# Patient Record
Sex: FEMALE | Race: BLACK OR AFRICAN AMERICAN | NOT HISPANIC OR LATINO | RURAL
[De-identification: names, ages, dates, MRNs, and addresses within clinical notes are randomized per-mention and may not be internally consistent; named-entity substitution may affect disease eponyms.]

---

## 2022-06-21 ENCOUNTER — OFFICE VISIT (OUTPATIENT)
Dept: OBSTETRICS AND GYNECOLOGY | Facility: CLINIC | Age: 32
End: 2022-06-21
Payer: OTHER GOVERNMENT

## 2022-06-21 DIAGNOSIS — O34.219 HISTORY OF CESAREAN DELIVERY, CURRENTLY PREGNANT: ICD-10-CM

## 2022-06-21 DIAGNOSIS — N91.1 SECONDARY AMENORRHEA: Primary | ICD-10-CM

## 2022-06-21 LAB
B-HCG UR QL: POSITIVE
CTP QC/QA: YES

## 2022-06-21 PROCEDURE — 81025 POCT URINE PREGNANCY: ICD-10-PCS | Mod: QW,,, | Performed by: OBSTETRICS & GYNECOLOGY

## 2022-06-21 PROCEDURE — 99499 UNLISTED E&M SERVICE: CPT | Mod: ,,, | Performed by: OBSTETRICS & GYNECOLOGY

## 2022-06-21 PROCEDURE — 76801 PR US, OB <14WKS, TRANSABD, SINGLE GESTATION: ICD-10-PCS | Mod: ,,, | Performed by: OBSTETRICS & GYNECOLOGY

## 2022-06-21 PROCEDURE — 76801 OB US < 14 WKS SINGLE FETUS: CPT | Mod: ,,, | Performed by: OBSTETRICS & GYNECOLOGY

## 2022-06-21 PROCEDURE — 81025 URINE PREGNANCY TEST: CPT | Mod: QW,,, | Performed by: OBSTETRICS & GYNECOLOGY

## 2022-06-21 PROCEDURE — 99203 OFFICE O/P NEW LOW 30 MIN: CPT | Mod: ,,, | Performed by: OBSTETRICS & GYNECOLOGY

## 2022-06-21 PROCEDURE — 99499 NO LOS: ICD-10-PCS | Mod: ,,, | Performed by: OBSTETRICS & GYNECOLOGY

## 2022-06-21 PROCEDURE — 99203 PR OFFICE/OUTPT VISIT, NEW, LEVL III, 30-44 MIN: ICD-10-PCS | Mod: ,,, | Performed by: OBSTETRICS & GYNECOLOGY

## 2022-06-21 RX ORDER — VITAMIN- MINERAL OMEGA-3 SUPPLEMENT 53.5; 38; 25; 1; 2; 3; 1.8; 5; 25; 300; 12.5; 2; 5; 10; 2 MG/1; MG/1; MG/1; MG/1; MG/1; MG/1; MG/1; MG/1; MG/1; UG/1; UG/1; MG/1; MG/1; MG/1; MG/1
1 CAPSULE, LIQUID FILLED ORAL DAILY
Qty: 30 CAPSULE | Refills: 11 | Status: SHIPPED | OUTPATIENT
Start: 2022-06-21 | End: 2023-06-21

## 2022-06-21 RX ORDER — CHOLECALCIFEROL (VITAMIN D3) 25 MCG
TABLET ORAL
COMMUNITY
Start: 2022-05-18 | End: 2022-08-16

## 2022-06-21 NOTE — PROGRESS NOTES
CC: Positive Pregnancy Test    HISTORY OF PRESENT ILLNESS:    Bette De La Cruz is a 31 y.o. female, ,  Presents today for a routine exam complaining of amenorrhea and positive home urine pregnancy test.  No LMP recorded.   She is not currently on any contraception.  Reports nausea. Reports breast tenderness. Denies vaginal bleeding and pelvic pain.      Review of patient's allergies indicates:  No Known Allergies    History reviewed. No pertinent past medical history.  Past Surgical History:   Procedure Laterality Date     SECTION       OB History        1    Para   1    Term   1            AB        Living   1       SAB        IAB        Ectopic        Multiple        Live Births   1               Family History   Family history unknown: Yes     Social History     Tobacco Use    Smoking status: Never Smoker    Smokeless tobacco: Never Used   Substance Use Topics    Alcohol use: Not Currently    Drug use: Never       Current Outpatient Medications   Medication Sig    vitamin D (VITAMIN D3) 1000 units Tab TAKE ONE TABLET BY MOUTH ONCE A DAY FOR SUPPLEMENTATION    mvn-min75-iron-iron ps-om3-dha (CONCEPT DHA) 35-1-200 mg Cap Take 1 capsule by mouth once daily.     No current facility-administered medications for this visit.       OB History    Para Term  AB Living   1 1 1     1   SAB IAB Ectopic Multiple Live Births           1      # Outcome Date GA Lbr Mikey/2nd Weight Sex Delivery Anes PTL Lv   1 Term 19 40w0d  2.835 kg (6 lb 4 oz) F CS-Unspec   ASA          ROS:  GENERAL: No weight changes. No swelling. No fatigue. No fever.  CARDIOVASCULAR: No chest pain. No shortness of breath. No leg cramps.   NEUROLOGICAL: No headaches. No vision changes.  BREASTS: No pain. No lumps. No discharge.  ABDOMEN: No pain. No diarrhea. No constipation.  REPRODUCTIVE: No abnormal bleeding.   VULVA: No pain. No lesions. No itching.  VAGINA: No relaxation. No itching. No odor. No  discharge. No lesions.  URINARY: No incontinence. No nocturia. No frequency. No dysuria.    There were no vitals taken for this visit.    PE:  AFFECT: Calm, alert and oriented X 3. Interactive during exam  GENERAL: Appears well-nourished, well-developed, in no acute distress.  HEAD: Normocephalic, atruamatic  TEETH: Good dentition.  THYROID: No thyromegally   BREASTS: No masses, skin changes, nipple discharge or adenopathy bilaterally.  SKIN: Normal for race, warm, & dry. No lesions or rashes.  LUNGS: Easy and unlabored, clear to auscultation bilaterally.  HEART: Regular rate and rhythm   ABDOMEN: Soft and nontender without masses or organomegally.  VULVA: No lesions, masses or tenderness.  VAGINA: Moist and well rugated without lesions or discharge.  CERVIX: Moist and pink without lesions, discharge or tenderness.      UTERUS SIZE: 6 week size, nontender and without masses.  ADNEXA: No masses or tenderness.  ESTIMATE OF PELVIC CAPACITY: Adequate  EXTREMITIES: No cyanosis, clubbing or edema. No calf tenderness.  LYMPH NODES: No axillary or inguinal adenopathy.      ASSESSMENT:       ICD-10-CM ICD-9-CM    1. Secondary amenorrhea  N91.1 626.0 POCT urine pregnancy      US OB <14 Wks, TransAbd, Single Gestation      mvn-min75-iron-iron ps-om3-dha (CONCEPT DHA) 35-1-200 mg Cap   2. History of  delivery, currently pregnant  O34.219 654.20           Plan:     Amenorrhea  Positive urine pregnancy test (KEVIN: 2023, EGA: 5w4d based on LMP)    -  Routine prenatal care    Nausea and vomiting in pregnancy    -  Education regarding lifestyle and dietary modifications    -  Advised use of B6/Unisom. Pt will notify us if no relief/worsening symptoms, will consider Zofran if needed.      1st TRIMESTER COUNSELING: Discussed all, booklet provided:  Common complaints of pregnancy  HIV and other routine prenatal tests including  genetic screening  Risk factors identified by prenatal history  Oriented to practice -  discussed anticipated course of prenatal care & indications for Ultrasound  Childbirth classes/Hospital facilities   Nutrition and weight gain counseling  Toxoplasmosis precautions (Cats/Raw Meat)  Sexual activity and exercise  Environmental/Work hazards  Travel  Tobacco (Ask, Advise, Assess, Assist, and Arrange), as well as alcohol and drug use  Use of any medications (Including supplements, Vitamins, Herbs, or OTC Drugs)  Domestic violence  Seat belt use      TERATOLOGY COUNSELING:   Discussed indications and options for aneuploidy screening - pamphlets given    -  Pt desires testing and orders placed    -  Pt declines testing  Dating US scheduled  FOLLOW-UP in 4 weeks with Dr. Jean Lafleur M.D., FCOG    OB/GYN

## 2022-06-28 ENCOUNTER — PROCEDURE VISIT (OUTPATIENT)
Dept: OBSTETRICS AND GYNECOLOGY | Facility: CLINIC | Age: 32
End: 2022-06-28
Payer: OTHER GOVERNMENT

## 2022-06-28 DIAGNOSIS — N91.1 SECONDARY AMENORRHEA: Primary | ICD-10-CM

## 2022-06-28 PROCEDURE — 99499 UNLISTED E&M SERVICE: CPT | Mod: ,,, | Performed by: OBSTETRICS & GYNECOLOGY

## 2022-06-28 PROCEDURE — 76801 OB US < 14 WKS SINGLE FETUS: CPT | Mod: ,,, | Performed by: OBSTETRICS & GYNECOLOGY

## 2022-06-28 PROCEDURE — 76801 PR US, OB <14WKS, TRANSABD, SINGLE GESTATION: ICD-10-PCS | Mod: ,,, | Performed by: OBSTETRICS & GYNECOLOGY

## 2022-06-28 PROCEDURE — 99499 NO LOS: ICD-10-PCS | Mod: ,,, | Performed by: OBSTETRICS & GYNECOLOGY

## 2022-07-26 ENCOUNTER — OFFICE VISIT (OUTPATIENT)
Dept: OBSTETRICS AND GYNECOLOGY | Facility: CLINIC | Age: 32
End: 2022-07-26
Payer: OTHER GOVERNMENT

## 2022-07-26 DIAGNOSIS — Z11.3 SCREEN FOR STD (SEXUALLY TRANSMITTED DISEASE): ICD-10-CM

## 2022-07-26 DIAGNOSIS — Z11.4 SCREENING FOR HIV (HUMAN IMMUNODEFICIENCY VIRUS): ICD-10-CM

## 2022-07-26 DIAGNOSIS — Z12.4 SCREENING FOR MALIGNANT NEOPLASM OF THE CERVIX: ICD-10-CM

## 2022-07-26 DIAGNOSIS — B37.31 VAGINAL YEAST INFECTION: ICD-10-CM

## 2022-07-26 DIAGNOSIS — Z72.51 HIGH RISK SEXUAL BEHAVIOR, UNSPECIFIED TYPE: ICD-10-CM

## 2022-07-26 DIAGNOSIS — Z01.419 ROUTINE GYNECOLOGICAL EXAMINATION: Primary | ICD-10-CM

## 2022-07-26 DIAGNOSIS — N89.8 VAGINAL DISCHARGE: ICD-10-CM

## 2022-07-26 DIAGNOSIS — Z3A.10 10 WEEKS GESTATION OF PREGNANCY: ICD-10-CM

## 2022-07-26 LAB
CANDIDA SPECIES: POSITIVE
GARDNERELLA: POSITIVE
HAV IGM SER QL: NORMAL
HBV CORE IGM SER QL: NORMAL
HBV SURFACE AG SERPL QL IA: NORMAL
HCV AB SER QL: NORMAL
HIV 1+O+2 AB SERPL QL: NORMAL
SYPHILIS AB INTERPRETATION: NORMAL
TRICHOMONAS: NEGATIVE

## 2022-07-26 PROCEDURE — 87591 CHLAMYDIA/GONORRHOEAE(GC), PCR: ICD-10-PCS | Mod: ,,, | Performed by: CLINICAL MEDICAL LABORATORY

## 2022-07-26 PROCEDURE — 87389 HIV 1 / 2 ANTIBODY: ICD-10-PCS | Mod: ,,, | Performed by: CLINICAL MEDICAL LABORATORY

## 2022-07-26 PROCEDURE — 87491 CHLMYD TRACH DNA AMP PROBE: CPT | Mod: ,,, | Performed by: CLINICAL MEDICAL LABORATORY

## 2022-07-26 PROCEDURE — 87624 HPV HI-RISK TYP POOLED RSLT: CPT | Mod: ,,, | Performed by: CLINICAL MEDICAL LABORATORY

## 2022-07-26 PROCEDURE — 87480 CANDIDA DNA DIR PROBE: CPT | Mod: ,,, | Performed by: CLINICAL MEDICAL LABORATORY

## 2022-07-26 PROCEDURE — 36415 PR COLLECTION VENOUS BLOOD,VENIPUNCTURE: ICD-10-PCS | Mod: ,,, | Performed by: CLINICAL MEDICAL LABORATORY

## 2022-07-26 PROCEDURE — 87510 BACTERIAL VAGINOSIS: ICD-10-PCS | Mod: ,,, | Performed by: CLINICAL MEDICAL LABORATORY

## 2022-07-26 PROCEDURE — 86780 TREPONEMA PALLIDUM: CPT | Mod: ,,, | Performed by: CLINICAL MEDICAL LABORATORY

## 2022-07-26 PROCEDURE — 87624 HUMAN PAPILLOMAVIRUS (HPV): ICD-10-PCS | Mod: ,,, | Performed by: CLINICAL MEDICAL LABORATORY

## 2022-07-26 PROCEDURE — 80074 ACUTE HEPATITIS PANEL: CPT | Mod: ,,, | Performed by: CLINICAL MEDICAL LABORATORY

## 2022-07-26 PROCEDURE — 87480 BACTERIAL VAGINOSIS: ICD-10-PCS | Mod: ,,, | Performed by: CLINICAL MEDICAL LABORATORY

## 2022-07-26 PROCEDURE — 80074 HEPATITIS PANEL, ACUTE: ICD-10-PCS | Mod: ,,, | Performed by: CLINICAL MEDICAL LABORATORY

## 2022-07-26 PROCEDURE — 87510 GARDNER VAG DNA DIR PROBE: CPT | Mod: ,,, | Performed by: CLINICAL MEDICAL LABORATORY

## 2022-07-26 PROCEDURE — 88142 CYTOPATH C/V THIN LAYER: CPT | Mod: GCY | Performed by: OBSTETRICS & GYNECOLOGY

## 2022-07-26 PROCEDURE — 87389 HIV-1 AG W/HIV-1&-2 AB AG IA: CPT | Mod: ,,, | Performed by: CLINICAL MEDICAL LABORATORY

## 2022-07-26 PROCEDURE — 99395 PREV VISIT EST AGE 18-39: CPT | Mod: ,,, | Performed by: OBSTETRICS & GYNECOLOGY

## 2022-07-26 PROCEDURE — 87591 N.GONORRHOEAE DNA AMP PROB: CPT | Mod: ,,, | Performed by: CLINICAL MEDICAL LABORATORY

## 2022-07-26 PROCEDURE — 87491 CHLAMYDIA/GONORRHOEAE(GC), PCR: ICD-10-PCS | Mod: ,,, | Performed by: CLINICAL MEDICAL LABORATORY

## 2022-07-26 PROCEDURE — 36415 COLL VENOUS BLD VENIPUNCTURE: CPT | Mod: ,,, | Performed by: CLINICAL MEDICAL LABORATORY

## 2022-07-26 PROCEDURE — 99395 PR PREVENTIVE VISIT,EST,18-39: ICD-10-PCS | Mod: ,,, | Performed by: OBSTETRICS & GYNECOLOGY

## 2022-07-26 PROCEDURE — 87660 TRICHOMONAS VAGIN DIR PROBE: CPT | Mod: ,,, | Performed by: CLINICAL MEDICAL LABORATORY

## 2022-07-26 PROCEDURE — 86780 TREPONEMA PALLIDUM (SYPHILIS) ANTIBODY: ICD-10-PCS | Mod: ,,, | Performed by: CLINICAL MEDICAL LABORATORY

## 2022-07-26 PROCEDURE — 87660 BACTERIAL VAGINOSIS: ICD-10-PCS | Mod: ,,, | Performed by: CLINICAL MEDICAL LABORATORY

## 2022-07-26 RX ORDER — MICONAZOLE NITRATE 2 %
1 CREAM WITH APPLICATOR VAGINAL NIGHTLY
Qty: 45 G | Refills: 0 | Status: SHIPPED | OUTPATIENT
Start: 2022-07-26 | End: 2023-01-04

## 2022-07-26 NOTE — PROGRESS NOTES
CC: Well woman exam    Bette De La Cruz is a 31 y.o. female  presents for well woman exam.  LMP: No LMP recorded..  No issues, problems, or complaints.    Last mammogram: N/A    No past medical history on file.  Past Surgical History:   Procedure Laterality Date     SECTION       Social History     Socioeconomic History    Marital status:    Tobacco Use    Smoking status: Never Smoker    Smokeless tobacco: Never Used   Substance and Sexual Activity    Alcohol use: Not Currently    Drug use: Never    Sexual activity: Yes     Partners: Male     Family History   Family history unknown: Yes     OB History        1    Para   1    Term   1            AB        Living   1       SAB        IAB        Ectopic        Multiple        Live Births   1                 There were no vitals taken for this visit.      ROS:  GENERAL: Denies weight gain or weight loss. Feeling well overall.   SKIN: Denies rash or lesions.   HEAD: Denies head injury or headache.   NODES: Denies enlarged lymph nodes.   CHEST: Denies chest pain or shortness of breath.   CARDIOVASCULAR: Denies palpitations or left sided chest pain.   ABDOMEN: No abdominal pain, constipation, diarrhea, nausea, vomiting or rectal bleeding.   URINARY: No frequency, dysuria, hematuria, or burning on urination.  REPRODUCTIVE: See HPI.   BREASTS: The patient performs breast self-examination and denies pain, lumps, or nipple discharge.   HEMATOLOGIC: No easy bruisability or excessive bleeding.   MUSCULOSKELETAL: Denies joint pain or swelling.   NEUROLOGIC: Denies syncope or weakness.   PSYCHIATRIC: Denies depression, anxiety or mood swings.    PHYSICAL EXAM:  APPEARANCE: Well nourished, well developed, in no acute distress.  AFFECT: WNL, alert and oriented x 3  SKIN: No acne or hirsutism  NECK: Neck symmetric without masses or thyromegaly  NODES: No inguinal, cervical, axillary, or femoral lymph node enlargement  CHEST: Good  respiratory effect  ABDOMEN: Soft.  No tenderness or masses.  No hepatosplenomegaly.  No hernias.  BREASTS: Symmetrical, no skin changes or visible lesions.  No palpable masses, nipple discharge bilaterally.  PELVIC: Normal external genitalia without lesions.  Normal hair distribution.  Adequate perineal body, normal urethral meatus.  Vagina moist and well rugated without lesions or discharge.  Cervix pink, without lesions, discharge or tenderness.  No significant cystocele or rectocele.  Bimanual exam shows uterus to be normal size, regular, mobile and nontender.  Adnexa without masses or tenderness.    EXTREMITIES: No edema.    Routine gynecological examination  -     Chlamydia/GC, PCR  -     HIV 1/2 Ag/Ab (4th Gen); Future; Expected date: 07/26/2022  -     Treponema Pallidum (Syphillis) Antibody; Future; Expected date: 07/26/2022  -     Hepatitis Panel, Acute; Future; Expected date: 07/26/2022  -     Bacterial Vaginosis; Future; Expected date: 07/26/2022  -     Misc Sendout Test, Blood Yuki; Future; Expected date: 07/26/2022  -     POCT Urinalysis  -     ThinPrep Pap Test; Future; Expected date: 07/26/2022    Screening for malignant neoplasm of the cervix  -     ThinPrep Pap Test; Future; Expected date: 07/26/2022    Screen for STD (sexually transmitted disease)  -     Chlamydia/GC, PCR  -     HIV 1/2 Ag/Ab (4th Gen); Future; Expected date: 07/26/2022  -     Treponema Pallidum (Syphillis) Antibody; Future; Expected date: 07/26/2022  -     Hepatitis Panel, Acute; Future; Expected date: 07/26/2022  -     Bacterial Vaginosis; Future; Expected date: 07/26/2022  -     Misc Sendout Test, Blood Yuki; Future; Expected date: 07/26/2022    High risk sexual behavior, unspecified type  -     Chlamydia/GC, PCR  -     HIV 1/2 Ag/Ab (4th Gen); Future; Expected date: 07/26/2022  -     Treponema Pallidum (Syphillis) Antibody; Future; Expected date: 07/26/2022  -     Hepatitis Panel, Acute; Future; Expected date: 07/26/2022  -      Bacterial Vaginosis; Future; Expected date: 07/26/2022  -     Misc Sendout Test, Blood Yuki; Future; Expected date: 07/26/2022    Screening for HIV (human immunodeficiency virus)  -     HIV 1/2 Ag/Ab (4th Gen); Future; Expected date: 07/26/2022    Vaginal discharge    Vaginal yeast infection  -     miconazole (MONISTAT 7) 2 % vaginal cream; Place 1 applicator vaginally every evening.  Dispense: 45 g; Refill: 0    10 weeks gestation of pregnancy  -     Chlamydia/GC, PCR  -     HIV 1/2 Ag/Ab (4th Gen); Future; Expected date: 07/26/2022  -     Treponema Pallidum (Syphillis) Antibody; Future; Expected date: 07/26/2022  -     Hepatitis Panel, Acute; Future; Expected date: 07/26/2022  -     Bacterial Vaginosis; Future; Expected date: 07/26/2022  -     Misc Sendout Test, Blood Yuki; Future; Expected date: 07/26/2022  -     POCT Urinalysis  -     ThinPrep Pap Test; Future; Expected date: 07/26/2022            Patient was counseled today on A.C.S. Pap guidelines and recommendations for yearly pelvic exams, mammograms and monthly self breast exams; to see her PCP for other health maintenance.   Exercise regimen encouraged  Healthy food choices encouraged  Questions answered to desired level of satisfaction  Verbalized understanding to all information and instructions    Follow up in about 1 year (around 7/26/2023) for Annual Exam.

## 2022-07-27 DIAGNOSIS — B37.31 VAGINAL CANDIDA: ICD-10-CM

## 2022-07-27 DIAGNOSIS — N76.0 BV (BACTERIAL VAGINOSIS): Primary | ICD-10-CM

## 2022-07-27 DIAGNOSIS — B96.89 BV (BACTERIAL VAGINOSIS): Primary | ICD-10-CM

## 2022-07-27 LAB
CHLAMYDIA BY PCR: NEGATIVE
N. GONORRHOEAE (GC) BY PCR: NEGATIVE

## 2022-07-27 RX ORDER — METRONIDAZOLE 500 MG/1
500 TABLET ORAL 2 TIMES DAILY
Qty: 14 TABLET | Refills: 0 | Status: SHIPPED | OUTPATIENT
Start: 2022-07-27 | End: 2022-08-03

## 2022-07-27 RX ORDER — TERCONAZOLE 4 MG/G
1 CREAM VAGINAL NIGHTLY
Qty: 45 G | Refills: 0 | Status: SHIPPED | OUTPATIENT
Start: 2022-07-27 | End: 2023-01-04

## 2022-07-28 LAB
GH SERPL-MCNC: NORMAL NG/ML
INSULIN SERPL-ACNC: NORMAL U[IU]/ML
LAB AP CLINICAL INFORMATION: NORMAL
LAB AP GYN INTERPRETATION: NEGATIVE
LAB AP PAP DISCLAIMER COMMENTS: NORMAL
RENIN PLAS-CCNC: NORMAL NG/ML/H

## 2022-07-28 NOTE — PROCEDURES
Procedures   Ultrasound note:    Uterus 7.894.19 x 5.43 cm    Right ovary 3.53 x 2.11 2.21 cm  Left ovary 3.0 x 2.25 x 2.06 cm gestational sac 5 weeks 6    Impression:  Early OB with gestational sac visualized endometrial cavity no fetal pole at this time  Estimated delivery by gestational sac only February 15, 2023  Estimated gestational age by gestational sac only 5 week 6 day

## 2022-07-29 NOTE — PROCEDURES
Procedures   Ultrasound note:    Uterus 8.64 x 5.24 x 5.39 cm    Right ovary 3.36 x 2.32 x 2.29 cm next day  Left ovary 2.60 x 2.77 x 6.09 cm    Crown-rump length 6 weeks 4 day  Fetal heart rate 100 20 cm    Impression:  IUP with fetal heart tones  Estimated delivery February 19, 2022  Estimated gestational age 6 weeks 4 day

## 2022-08-02 LAB
HPV 16: NEGATIVE
HPV 18: NEGATIVE
HPV OTHER: NEGATIVE

## 2022-08-08 ENCOUNTER — ROUTINE PRENATAL (OUTPATIENT)
Dept: OBSTETRICS AND GYNECOLOGY | Facility: CLINIC | Age: 32
End: 2022-08-08
Payer: OTHER GOVERNMENT

## 2022-08-08 VITALS
HEART RATE: 83 BPM | HEIGHT: 62 IN | BODY MASS INDEX: 30.25 KG/M2 | SYSTOLIC BLOOD PRESSURE: 116 MMHG | WEIGHT: 164.38 LBS | DIASTOLIC BLOOD PRESSURE: 70 MMHG

## 2022-08-08 DIAGNOSIS — Z3A.12 12 WEEKS GESTATION OF PREGNANCY: Primary | ICD-10-CM

## 2022-08-08 DIAGNOSIS — O34.219 HISTORY OF CESAREAN DELIVERY, CURRENTLY PREGNANT: ICD-10-CM

## 2022-08-08 DIAGNOSIS — Z36.9 UNSPECIFIED ANTENATAL SCREENING: ICD-10-CM

## 2022-08-08 LAB
ANISOCYTOSIS BLD QL SMEAR: ABNORMAL
BASOPHILS # BLD AUTO: 0.01 K/UL (ref 0–0.2)
BASOPHILS NFR BLD AUTO: 0.2 % (ref 0–1)
BILIRUB SERPL-MCNC: NORMAL MG/DL
BLOOD, POC UA: NORMAL
DIFFERENTIAL METHOD BLD: ABNORMAL
EOSINOPHIL # BLD AUTO: 0.29 K/UL (ref 0–0.5)
EOSINOPHIL NFR BLD AUTO: 6.7 % (ref 1–4)
ERYTHROCYTE [DISTWIDTH] IN BLOOD BY AUTOMATED COUNT: 16.6 % (ref 11.5–14.5)
EST. AVERAGE GLUCOSE BLD GHB EST-MCNC: 94 MG/DL
GLUCOSE UR QL STRIP: NORMAL
HBA1C MFR BLD HPLC: 5.4 % (ref 4.5–6.6)
HCT VFR BLD AUTO: 35.8 % (ref 38–47)
HGB BLD-MCNC: 11.2 G/DL (ref 12–16)
HYPOCHROMIA BLD QL SMEAR: ABNORMAL
IMM GRANULOCYTES # BLD AUTO: 0.01 K/UL (ref 0–0.04)
IMM GRANULOCYTES NFR BLD: 0.2 % (ref 0–0.4)
INDIRECT COOMBS: NORMAL
KETONES UR QL STRIP: NORMAL
LEUKOCYTE ESTERASE URINE, POC: NORMAL
LYMPHOCYTES # BLD AUTO: 1.02 K/UL (ref 1–4.8)
LYMPHOCYTES NFR BLD AUTO: 23.6 % (ref 27–41)
MCH RBC QN AUTO: 23.3 PG (ref 27–31)
MCHC RBC AUTO-ENTMCNC: 31.3 G/DL (ref 32–36)
MCV RBC AUTO: 74.6 FL (ref 80–96)
MICROCYTES BLD QL SMEAR: ABNORMAL
MONOCYTES # BLD AUTO: 0.48 K/UL (ref 0–0.8)
MONOCYTES NFR BLD AUTO: 11.1 % (ref 2–6)
MPC BLD CALC-MCNC: ABNORMAL G/DL
NEUTROPHILS # BLD AUTO: 2.52 K/UL (ref 1.8–7.7)
NEUTROPHILS NFR BLD AUTO: 58.2 % (ref 53–65)
NITRITE, POC UA: NORMAL
NRBC # BLD AUTO: 0 X10E3/UL
NRBC, AUTO (.00): 0 %
PH, POC UA: 6.5
PLATELET # BLD AUTO: 288 K/UL (ref 150–400)
PLATELET MORPHOLOGY: ABNORMAL
PROTEIN, POC: NORMAL
RBC # BLD AUTO: 4.8 M/UL (ref 4.2–5.4)
RH BLD: NORMAL
RUBV IGG SER-ACNC: NORMAL [IU]/ML
SPECIFIC GRAVITY, POC UA: 1.02
UROBILINOGEN, POC UA: 1
WBC # BLD AUTO: 4.33 K/UL (ref 4.5–11)

## 2022-08-08 PROCEDURE — 0502F PR SUBSEQUENT PRENATAL CARE: ICD-10-PCS | Mod: ,,, | Performed by: OBSTETRICS & GYNECOLOGY

## 2022-08-08 PROCEDURE — 85025 COMPLETE CBC W/AUTO DIFF WBC: CPT | Mod: ,,, | Performed by: CLINICAL MEDICAL LABORATORY

## 2022-08-08 PROCEDURE — 86900 TYPE & SCREEN: ICD-10-PCS | Mod: ,,, | Performed by: CLINICAL MEDICAL LABORATORY

## 2022-08-08 PROCEDURE — 86900 BLOOD TYPING SEROLOGIC ABO: CPT | Mod: ,,, | Performed by: CLINICAL MEDICAL LABORATORY

## 2022-08-08 PROCEDURE — 36415 COLL VENOUS BLD VENIPUNCTURE: CPT | Mod: ,,, | Performed by: OBSTETRICS & GYNECOLOGY

## 2022-08-08 PROCEDURE — 85660 SICKLE CELL SCREEN: ICD-10-PCS | Mod: ,,, | Performed by: CLINICAL MEDICAL LABORATORY

## 2022-08-08 PROCEDURE — 86850 RBC ANTIBODY SCREEN: CPT | Mod: ,,, | Performed by: CLINICAL MEDICAL LABORATORY

## 2022-08-08 PROCEDURE — 86901 TYPE & SCREEN: ICD-10-PCS | Mod: ,,, | Performed by: CLINICAL MEDICAL LABORATORY

## 2022-08-08 PROCEDURE — 36415 PR COLLECTION VENOUS BLOOD,VENIPUNCTURE: ICD-10-PCS | Mod: ,,, | Performed by: OBSTETRICS & GYNECOLOGY

## 2022-08-08 PROCEDURE — 86901 BLOOD TYPING SEROLOGIC RH(D): CPT | Mod: ,,, | Performed by: CLINICAL MEDICAL LABORATORY

## 2022-08-08 PROCEDURE — 86850 TYPE & SCREEN: ICD-10-PCS | Mod: ,,, | Performed by: CLINICAL MEDICAL LABORATORY

## 2022-08-08 PROCEDURE — 83036 HEMOGLOBIN GLYCOSYLATED A1C: CPT | Mod: ,,, | Performed by: CLINICAL MEDICAL LABORATORY

## 2022-08-08 PROCEDURE — 87086 URINE CULTURE/COLONY COUNT: CPT | Mod: ,,, | Performed by: CLINICAL MEDICAL LABORATORY

## 2022-08-08 PROCEDURE — 86762 RUBELLA ANTIBODY SCREEN: ICD-10-PCS | Mod: ,,, | Performed by: CLINICAL MEDICAL LABORATORY

## 2022-08-08 PROCEDURE — 0502F SUBSEQUENT PRENATAL CARE: CPT | Mod: ,,, | Performed by: OBSTETRICS & GYNECOLOGY

## 2022-08-08 PROCEDURE — 87086 CULTURE, URINE: ICD-10-PCS | Mod: ,,, | Performed by: CLINICAL MEDICAL LABORATORY

## 2022-08-08 PROCEDURE — 85025 CBC WITH DIFFERENTIAL: ICD-10-PCS | Mod: ,,, | Performed by: CLINICAL MEDICAL LABORATORY

## 2022-08-08 PROCEDURE — 85660 RBC SICKLE CELL TEST: CPT | Mod: ,,, | Performed by: CLINICAL MEDICAL LABORATORY

## 2022-08-08 PROCEDURE — 86762 RUBELLA ANTIBODY: CPT | Mod: ,,, | Performed by: CLINICAL MEDICAL LABORATORY

## 2022-08-08 PROCEDURE — 83036 HEMOGLOBIN A1C: ICD-10-PCS | Mod: ,,, | Performed by: CLINICAL MEDICAL LABORATORY

## 2022-08-08 NOTE — PROGRESS NOTES
31 y.o. female  at 12w3d   Reports fetal movement or fluttering. Denies any vaginal bleeding, leakage of fluid, cramping, contractions, or pressure.   She complains of nothing.  Pt states she is doing well without any concerns.     The following were addressed during this visit:    1-8 Weeks  - Lifestyle Discussion   - Warning Signs   - Course of Care   - Physiology of Pregnancy   - Nutrition and Supplements   - Domestic Abuse Screen   - HIV Counseling   - Smoking Intervention   - SPAAD/Insurance Verification   - Importance of Exclusive Breastfeeding for First 6 Months   - Continuation of Breastfeeding of Complimentary after intro of solid foods   - Benefits of Breastfeeding     8-12 Weeks  - Review lab tests   - Genetic Counseling (NT/CVS/Amino)   - Influenza IM (for due date  - 3/31)   - Non-pharmacologic Pain Relief Methods for Labor & Birth       Daily fetal kick counts, bleeding, and  labor/labor precautions discussed.  Questions answered to desired level of satisfaction  Verbalized understanding to all information and instructions provided.    Total weight gain/weight loss in pregnancy: -0.272 kg (-9.6 oz)     A: 12w3d     ICD-10-CM ICD-9-CM    1. 12 weeks gestation of pregnancy  Z3A.12 V22.2 POCT URINALYSIS      CBC Auto Differential      Type & Screen      Rubella Antibody Screen      Sickle Cell Screen      Hemoglobin A1C      Urine culture      POCT Urine Drug Screen Presump      CBC Auto Differential      Type & Screen      Rubella Antibody Screen      Sickle Cell Screen      Hemoglobin A1C      Urine culture   2. History of  delivery, currently pregnant  O34.219 654.20    3. Unspecified  screening  Z36.9 V28.9 CBC Auto Differential      Type & Screen      Rubella Antibody Screen      Sickle Cell Screen      Hemoglobin A1C      Urine culture      POCT Urine Drug Screen Presump      CBC Auto Differential      Type & Screen      Rubella Antibody Screen      Sickle Cell  Screen      Hemoglobin A1C      Urine culture

## 2022-08-09 LAB — HGB S BLD QL SOLY: NEGATIVE

## 2022-08-10 ENCOUNTER — PATIENT MESSAGE (OUTPATIENT)
Dept: OBSTETRICS AND GYNECOLOGY | Facility: CLINIC | Age: 32
End: 2022-08-10
Payer: OTHER GOVERNMENT

## 2022-08-10 LAB — UA COMPLETE W REFLEX CULTURE PNL UR: NORMAL

## 2022-09-08 ENCOUNTER — ROUTINE PRENATAL (OUTPATIENT)
Dept: OBSTETRICS AND GYNECOLOGY | Facility: CLINIC | Age: 32
End: 2022-09-08
Payer: OTHER GOVERNMENT

## 2022-09-08 VITALS
DIASTOLIC BLOOD PRESSURE: 67 MMHG | SYSTOLIC BLOOD PRESSURE: 121 MMHG | WEIGHT: 169.63 LBS | HEART RATE: 67 BPM | BODY MASS INDEX: 31.02 KG/M2

## 2022-09-08 DIAGNOSIS — O34.219 HISTORY OF CESAREAN DELIVERY, CURRENTLY PREGNANT: ICD-10-CM

## 2022-09-08 DIAGNOSIS — Z3A.16 16 WEEKS GESTATION OF PREGNANCY: ICD-10-CM

## 2022-09-08 DIAGNOSIS — Z36.9 UNSPECIFIED ANTENATAL SCREENING: Primary | ICD-10-CM

## 2022-09-08 DIAGNOSIS — Z36.9 ANTENATAL SCREENING ENCOUNTER: ICD-10-CM

## 2022-09-08 LAB
BILIRUB SERPL-MCNC: NORMAL MG/DL
BLOOD URINE, POC: NORMAL
COLOR, POC UA: YELLOW
GLUCOSE UR QL STRIP: NORMAL
KETONES UR QL STRIP: NORMAL
LEUKOCYTE ESTERASE URINE, POC: NORMAL
NITRITE, POC UA: NORMAL
PH, POC UA: 7.5
PROTEIN, POC: NORMAL
SPECIFIC GRAVITY, POC UA: 1.02
UROBILINOGEN, POC UA: 0.2

## 2022-09-08 PROCEDURE — 0502F PR SUBSEQUENT PRENATAL CARE: ICD-10-PCS | Mod: ,,, | Performed by: OBSTETRICS & GYNECOLOGY

## 2022-09-08 PROCEDURE — 0502F SUBSEQUENT PRENATAL CARE: CPT | Mod: ,,, | Performed by: OBSTETRICS & GYNECOLOGY

## 2022-09-08 NOTE — PROGRESS NOTES
31 y.o. female  at 16w6d   Reports fetal movement or fluttering. Denies any vaginal bleeding, leakage of fluid, cramping, contractions, or pressure.   She complains of nothing  Pt states she is doing well without any concerns.     The following were addressed during this visit:    13-16 Weeks  - Quad screen   - Anatomy Ultrasound   - Breastfeeding Concerns & Resources   - Importance of Early Skin to Skin Contact         Daily fetal kick counts, bleeding, and  labor/labor precautions discussed.  Questions answered to desired level of satisfaction  Verbalized understanding to all information and instructions provided.    Total weight gain/weight loss in pregnancy: 2.087 kg (4 lb 9.6 oz)     A: 16w6d     ICD-10-CM ICD-9-CM    1. Unspecified  screening  Z36.9 V28.9 POCT URINALYSIS W/O SCOPE      US OB 14+ Wks, TransAbd, Single Gestation      2. 16 weeks gestation of pregnancy  Z3A.16 V22.2 POCT URINALYSIS W/O SCOPE      3.  screening encounter  Z36.9 V28.9 POCT URINALYSIS W/O SCOPE      4. History of  delivery, currently pregnant  O34.219 654.20

## 2022-10-03 ENCOUNTER — ROUTINE PRENATAL (OUTPATIENT)
Dept: OBSTETRICS AND GYNECOLOGY | Facility: CLINIC | Age: 32
End: 2022-10-03
Payer: OTHER GOVERNMENT

## 2022-10-03 ENCOUNTER — HOSPITAL ENCOUNTER (OUTPATIENT)
Dept: RADIOLOGY | Facility: HOSPITAL | Age: 32
Discharge: HOME | End: 2022-10-03
Attending: OBSTETRICS & GYNECOLOGY
Payer: OTHER GOVERNMENT

## 2022-10-03 VITALS
DIASTOLIC BLOOD PRESSURE: 79 MMHG | SYSTOLIC BLOOD PRESSURE: 126 MMHG | BODY MASS INDEX: 31.64 KG/M2 | HEART RATE: 92 BPM | WEIGHT: 173 LBS

## 2022-10-03 DIAGNOSIS — O28.3 ECHOGENIC INTRACARDIAC FOCUS OF FETUS ON PRENATAL ULTRASOUND: ICD-10-CM

## 2022-10-03 DIAGNOSIS — Z36.9 UNSPECIFIED ANTENATAL SCREENING: ICD-10-CM

## 2022-10-03 DIAGNOSIS — O34.219 PREVIOUS CESAREAN DELIVERY AFFECTING PREGNANCY, ANTEPARTUM: ICD-10-CM

## 2022-10-03 DIAGNOSIS — Z3A.20 20 WEEKS GESTATION OF PREGNANCY: Primary | ICD-10-CM

## 2022-10-03 LAB
BILIRUB SERPL-MCNC: NORMAL MG/DL
BLOOD, POC UA: NORMAL
GLUCOSE UR QL STRIP: NORMAL
KETONES UR QL STRIP: NORMAL
LEUKOCYTE ESTERASE URINE, POC: NORMAL
NITRITE, POC UA: NORMAL
PH, POC UA: 6.5
PROTEIN, POC: NORMAL
SPECIFIC GRAVITY, POC UA: 1.02
UROBILINOGEN, POC UA: 1

## 2022-10-03 PROCEDURE — 76805 US OB 14+ WEEKS, TRANSABDOM, SINGLE GESTATION: ICD-10-PCS | Mod: 26,,, | Performed by: RADIOLOGY

## 2022-10-03 PROCEDURE — 0502F PR SUBSEQUENT PRENATAL CARE: ICD-10-PCS | Mod: ,,, | Performed by: OBSTETRICS & GYNECOLOGY

## 2022-10-03 PROCEDURE — 76805 OB US >/= 14 WKS SNGL FETUS: CPT | Mod: TC

## 2022-10-03 PROCEDURE — 76805 OB US >/= 14 WKS SNGL FETUS: CPT | Mod: 26,,, | Performed by: RADIOLOGY

## 2022-10-03 PROCEDURE — 0502F SUBSEQUENT PRENATAL CARE: CPT | Mod: ,,, | Performed by: OBSTETRICS & GYNECOLOGY

## 2022-10-03 RX ORDER — OMEPRAZOLE 20 MG/1
20 CAPSULE, DELAYED RELEASE ORAL
COMMUNITY
Start: 2022-08-02

## 2022-10-03 RX ORDER — LORATADINE 10 MG/1
10 TABLET ORAL
COMMUNITY
Start: 2022-08-02

## 2022-10-03 NOTE — PROGRESS NOTES
32 y.o. female  at 20w3d   Reports fetal movement or fluttering. Denies any vaginal bleeding, leakage of fluid, cramping, contractions, or pressure.   She complains of nothing  Pt states she is doing well without any concerns.     Vitals  BP: 126/79  Pulse: 92  Weight: 78.5 kg (173 lb)  Prenatal  Fundal Height (cm): 20 cm  Fetal Heart Rate: 140s  Movement: Present  Urine Albumin/Glucose  Urine Albumin: Trace  Urine Glucose: Negative  Edema  LLE Edema: None  RLE Edema: None  Facial: None  Additional Edema?: No    Prenatal Labs:  Lab Results   Component Value Date    GROUPTRH O POS 2022    HGB 11.2 (L) 2022    HCT 35.8 (L) 2022     2022    SICKLE Negative 2022    HEPBSAG Non-Reactive 2022    [id number] Non-Reactive 2022    LABNGO Negative 2022    LABURIN Skin/Urogenital Aisha Isolated, no further workup. 2022       The following were addressed during this visit:    17-20 Weeks  - Quickening   - Lifestyle   - Ultrasound   - Importance of Early and Frequent Breastfeeding   - Baby-led Feeding   - Frequent feeding to help assure optimal milk production       Daily fetal kick counts, bleeding, and  labor/labor precautions discussed.  Questions answered to desired level of satisfaction  Verbalized understanding to all information and instructions provided.    Total weight gain/weight loss in pregnancy: 3.629 kg (8 lb)     Follow up in about 4 weeks (around 10/31/2022) for NOLAN.    A: 20w3d     ICD-10-CM ICD-9-CM    1. 20 weeks gestation of pregnancy  Z3A.20 V22.2 POCT Urinalysis      2. Unspecified  screening  Z36.9 V28.9 POCT Urinalysis      3. Previous  delivery affecting pregnancy, antepartum  O34.219 654.23             Genie Lafleur M.D., FCOG    OB/GYN

## 2022-10-10 ENCOUNTER — TELEPHONE (OUTPATIENT)
Dept: OBSTETRICS AND GYNECOLOGY | Facility: CLINIC | Age: 32
End: 2022-10-10
Payer: OTHER GOVERNMENT

## 2022-10-10 NOTE — TELEPHONE ENCOUNTER
----- Message from Genie Lafleur MD sent at 10/5/2022 11:22 AM CDT -----  Pt needs MFM consult secondary to echogenic cardiac focus found on ultrasound.

## 2022-10-13 ENCOUNTER — PATIENT MESSAGE (OUTPATIENT)
Dept: OBSTETRICS AND GYNECOLOGY | Facility: CLINIC | Age: 32
End: 2022-10-13
Payer: OTHER GOVERNMENT

## 2022-10-13 NOTE — TELEPHONE ENCOUNTER
----- Message from Maddie Fishman sent at 10/13/2022  1:46 PM CDT -----  PATIENT CALLED ABOUT A REFERRAL FOR AN ULTRASOUND SHE IS SUPPOSED TO GET. SHE CAN BE REACHED @ [phone]

## 2022-10-31 ENCOUNTER — ROUTINE PRENATAL (OUTPATIENT)
Dept: OBSTETRICS AND GYNECOLOGY | Facility: CLINIC | Age: 32
End: 2022-10-31
Payer: OTHER GOVERNMENT

## 2022-10-31 VITALS
DIASTOLIC BLOOD PRESSURE: 65 MMHG | WEIGHT: 176 LBS | BODY MASS INDEX: 32.19 KG/M2 | HEART RATE: 84 BPM | SYSTOLIC BLOOD PRESSURE: 121 MMHG

## 2022-10-31 DIAGNOSIS — Z3A.24 24 WEEKS GESTATION OF PREGNANCY: Primary | ICD-10-CM

## 2022-10-31 DIAGNOSIS — O28.3 ECHOGENIC INTRACARDIAC FOCUS OF FETUS ON PRENATAL ULTRASOUND: ICD-10-CM

## 2022-10-31 DIAGNOSIS — Z36.9 UNSPECIFIED ANTENATAL SCREENING: ICD-10-CM

## 2022-10-31 LAB
BILIRUB SERPL-MCNC: NORMAL MG/DL
BLOOD, POC UA: NORMAL
GLUCOSE UR QL STRIP: NORMAL
KETONES UR QL STRIP: NORMAL
LEUKOCYTE ESTERASE URINE, POC: NORMAL
NITRITE, POC UA: NORMAL
PH, POC UA: 7
PROTEIN, POC: NORMAL
SPECIFIC GRAVITY, POC UA: 1.02
UROBILINOGEN, POC UA: 1

## 2022-10-31 PROCEDURE — 0502F SUBSEQUENT PRENATAL CARE: CPT | Mod: ,,, | Performed by: OBSTETRICS & GYNECOLOGY

## 2022-10-31 PROCEDURE — 0502F PR SUBSEQUENT PRENATAL CARE: ICD-10-PCS | Mod: ,,, | Performed by: OBSTETRICS & GYNECOLOGY

## 2022-10-31 NOTE — PROGRESS NOTES
32 y.o. female  at 24w3d   Reports fetal movement or fluttering. Denies any vaginal bleeding, leakage of fluid, cramping, contractions, or pressure.   She complains of nothing.  Pt states she is doing well without any concerns.     Vitals  BP: 121/65  Pulse: 84  Weight: 79.8 kg (176 lb)  Prenatal  Fundal Height (cm): 24 cm  Fetal Heart Rate: 150s  Movement: Present  Urine Albumin/Glucose  Urine Albumin: Negative  Urine Glucose: Negative  Edema  LLE Edema: None  RLE Edema: None  Facial: None  Additional Edema?: No    Prenatal Labs:  Lab Results   Component Value Date    GROUPTRH O POS 2022    HGB 11.2 (L) 2022    HCT 35.8 (L) 2022     2022    SICKLE Negative 2022    HEPBSAG Non-Reactive 2022    [id number] Non-Reactive 2022    LABNGO Negative 2022    LABURIN Skin/Urogenital Aisha Isolated, no further workup. 2022       The following were addressed during this visit:    21-24 Weeks  -  Labor Signs   - Travel During Pregnancy   - Gestational diabetes screening protocol   - Effective Position and Latch   - Risks of Formula Use   - Risks of pacifier use       Daily fetal kick counts, bleeding, and  labor/labor precautions discussed.  Questions answered to desired level of satisfaction  Verbalized understanding to all information and instructions provided.    Total weight gain/weight loss in pregnancy: 4.99 kg (11 lb)     Follow up in about 4 weeks (around 2022) for NOLAN, w/ 1 Hr GTT.    A: 24w3d     ICD-10-CM ICD-9-CM    1. 24 weeks gestation of pregnancy  Z3A.24 V22.2 POCT Urinalysis      2. Unspecified  screening  Z36.9 V28.9 POCT Urinalysis      Glucose, 1Hr Post Prandial      3. Echogenic intracardiac focus of fetus on prenatal ultrasound  O28.3 796.5             Genie Lafleur M.D., FCOG    OB/GYN

## 2022-11-28 ENCOUNTER — ROUTINE PRENATAL (OUTPATIENT)
Dept: OBSTETRICS AND GYNECOLOGY | Facility: CLINIC | Age: 32
End: 2022-11-28
Payer: OTHER GOVERNMENT

## 2022-11-28 VITALS
SYSTOLIC BLOOD PRESSURE: 128 MMHG | HEART RATE: 94 BPM | WEIGHT: 177.19 LBS | BODY MASS INDEX: 32.41 KG/M2 | DIASTOLIC BLOOD PRESSURE: 64 MMHG

## 2022-11-28 DIAGNOSIS — O34.219 PREVIOUS CESAREAN DELIVERY AFFECTING PREGNANCY, ANTEPARTUM: ICD-10-CM

## 2022-11-28 DIAGNOSIS — O28.3 ECHOGENIC INTRACARDIAC FOCUS OF FETUS ON PRENATAL ULTRASOUND: ICD-10-CM

## 2022-11-28 DIAGNOSIS — Z36.9 UNSPECIFIED ANTENATAL SCREENING: ICD-10-CM

## 2022-11-28 DIAGNOSIS — Z36.9 ANTENATAL SCREENING ENCOUNTER: ICD-10-CM

## 2022-11-28 DIAGNOSIS — Z3A.28 28 WEEKS GESTATION OF PREGNANCY: Primary | ICD-10-CM

## 2022-11-28 LAB
ANISOCYTOSIS BLD QL SMEAR: NORMAL
BASOPHILS # BLD AUTO: 0.02 K/UL (ref 0–0.2)
BASOPHILS NFR BLD AUTO: 0.3 % (ref 0–1)
BILIRUB SERPL-MCNC: NORMAL MG/DL
BLOOD, POC UA: NORMAL
CTP QC/QA: YES
DIFFERENTIAL METHOD BLD: ABNORMAL
EOSINOPHIL # BLD AUTO: 0.23 K/UL (ref 0–0.5)
EOSINOPHIL NFR BLD AUTO: 3.2 % (ref 1–4)
ERYTHROCYTE [DISTWIDTH] IN BLOOD BY AUTOMATED COUNT: 15.4 % (ref 11.5–14.5)
GLUCOSE SERPL-MCNC: 85 MG/DL (ref 74–106)
GLUCOSE UR QL STRIP: NORMAL
HCT VFR BLD AUTO: 31.3 % (ref 38–47)
HGB BLD-MCNC: 9.5 G/DL (ref 12–16)
HYPOCHROMIA BLD QL SMEAR: NORMAL
IMM GRANULOCYTES # BLD AUTO: 0.02 K/UL (ref 0–0.04)
IMM GRANULOCYTES NFR BLD: 0.3 % (ref 0–0.4)
KETONES UR QL STRIP: NORMAL
LEUKOCYTE ESTERASE URINE, POC: NORMAL
LYMPHOCYTES # BLD AUTO: 1.54 K/UL (ref 1–4.8)
LYMPHOCYTES NFR BLD AUTO: 21.4 % (ref 27–41)
MCH RBC QN AUTO: 23 PG (ref 27–31)
MCHC RBC AUTO-ENTMCNC: 30.4 G/DL (ref 32–36)
MCV RBC AUTO: 75.8 FL (ref 80–96)
MICROCYTES BLD QL SMEAR: NORMAL
MONOCYTES # BLD AUTO: 0.4 K/UL (ref 0–0.8)
MONOCYTES NFR BLD AUTO: 5.6 % (ref 2–6)
MPC BLD CALC-MCNC: ABNORMAL G/DL
NEUTROPHILS # BLD AUTO: 4.98 K/UL (ref 1.8–7.7)
NEUTROPHILS NFR BLD AUTO: 69.2 % (ref 53–65)
NITRITE, POC UA: NORMAL
NRBC # BLD AUTO: 0 X10E3/UL
NRBC, AUTO (.00): 0 %
PH, POC UA: 6.5
PLATELET # BLD AUTO: 287 K/UL (ref 150–400)
POC (AMP) AMPHETAMINE: NEGATIVE
POC (BAR) BARBITURATES: NEGATIVE
POC (BUP) BUPRENORPHINE: NEGATIVE
POC (BZO) BENZODIAZEPINES: NEGATIVE
POC (COC) COCAINE: NEGATIVE
POC (MDMA) METHYLENEDIOXYMETHAMPHETAMINE 3,4: NEGATIVE
POC (MET) METHAMPHETAMINE: NEGATIVE
POC (MOP) OPIATES: NEGATIVE
POC (MTD) METHADONE: NEGATIVE
POC (OXY) OXYCODONE: NEGATIVE
POC (PCP) PHENCYCLIDINE: NEGATIVE
POC (TCA) TRICYCLIC ANTIDEPRESSANTS: NEGATIVE
POC TEMPERATURE (URINE): 94
POC THC: NEGATIVE
POLYCHROMASIA BLD QL SMEAR: NORMAL
PROTEIN, POC: 30
RBC # BLD AUTO: 4.13 M/UL (ref 4.2–5.4)
SCHISTOCYTES BLD QL AUTO: NORMAL
SPECIFIC GRAVITY, POC UA: >=1.03
UROBILINOGEN, POC UA: 1
WBC # BLD AUTO: 7.19 K/UL (ref 4.5–11)

## 2022-11-28 PROCEDURE — 85025 CBC WITH DIFFERENTIAL: ICD-10-PCS | Mod: ,,, | Performed by: CLINICAL MEDICAL LABORATORY

## 2022-11-28 PROCEDURE — 80305 DRUG TEST PRSMV DIR OPT OBS: CPT | Mod: QW,,, | Performed by: OBSTETRICS & GYNECOLOGY

## 2022-11-28 PROCEDURE — 0502F SUBSEQUENT PRENATAL CARE: CPT | Mod: ,,, | Performed by: OBSTETRICS & GYNECOLOGY

## 2022-11-28 PROCEDURE — 82950 GLUCOSE, 1HR POST PRANDIAL: ICD-10-PCS | Mod: ,,, | Performed by: CLINICAL MEDICAL LABORATORY

## 2022-11-28 PROCEDURE — 36415 COLL VENOUS BLD VENIPUNCTURE: CPT | Mod: ,,, | Performed by: OBSTETRICS & GYNECOLOGY

## 2022-11-28 PROCEDURE — 0502F PR SUBSEQUENT PRENATAL CARE: ICD-10-PCS | Mod: ,,, | Performed by: OBSTETRICS & GYNECOLOGY

## 2022-11-28 PROCEDURE — 82950 GLUCOSE TEST: CPT | Mod: ,,, | Performed by: CLINICAL MEDICAL LABORATORY

## 2022-11-28 PROCEDURE — 36415 PR COLLECTION VENOUS BLOOD,VENIPUNCTURE: ICD-10-PCS | Mod: ,,, | Performed by: OBSTETRICS & GYNECOLOGY

## 2022-11-28 PROCEDURE — 80305 POCT URINE DRUG SCREEN PRESUMP: ICD-10-PCS | Mod: QW,,, | Performed by: OBSTETRICS & GYNECOLOGY

## 2022-11-28 PROCEDURE — 85025 COMPLETE CBC W/AUTO DIFF WBC: CPT | Mod: ,,, | Performed by: CLINICAL MEDICAL LABORATORY

## 2022-11-28 NOTE — PROGRESS NOTES
32 y.o. female  at 28w3d   Reports fetal movement or fluttering. Denies any vaginal bleeding, leakage of fluid, cramping, contractions, or pressure.   She complains of nothing.  Pt states she is doing well without any concerns.     Vitals  BP: 128/64  Pulse: 94  Weight: 80.4 kg (177 lb 3.2 oz)  Prenatal  Fundal Height (cm): 28 cm  Fetal Heart Rate: 140s  Movement: Present  Urine Albumin/Glucose  Urine Albumin: 1+  Urine Glucose: Negative  Edema  LLE Edema: None  RLE Edema: None  Facial: None  Additional Edema?: No    Prenatal Labs:  Lab Results   Component Value Date    GROUPTRH O POS 2022    HGB 11.2 (L) 2022    HCT 35.8 (L) 2022     2022    SICKLE Negative 2022    HEPBSAG Non-Reactive 2022    [id number] Non-Reactive 2022    LABNGO Negative 2022    LABURIN Skin/Urogenital Aisha Isolated, no further workup. 2022       The following were addressed during this visit:    25-28 Weeks  -  Labor Signs   - Childbirth Education   - Maternity Leave paperwork   - Smoking Intervention   - Weight Gain/Diet/Exercise   - Rhogam Given   - Rooming in baby during your hospital stay       Daily fetal kick counts, bleeding, and  labor/labor precautions discussed.  Questions answered to desired level of satisfaction  Verbalized understanding to all information and instructions provided.    Total weight gain/weight loss in pregnancy: 5.534 kg (12 lb 3.2 oz)     Follow up in about 4 weeks (around 2022) for NOLAN.    A: 28w3d     ICD-10-CM ICD-9-CM    1. 28 weeks gestation of pregnancy  Z3A.28 V22.2 POCT Urine Drug Screen Presump      CBC Auto Differential      POCT Urinalysis      2.  screening encounter  Z36.9 V28.9 POCT Urine Drug Screen Presump      CBC Auto Differential      POCT Urinalysis      3. Echogenic intracardiac focus of fetus on prenatal ultrasound  O28.3 796.5       4. Previous  delivery affecting pregnancy, antepartum   O34.219 654.23             Genie Lafleur M.D., OG    OB/GYN

## 2022-12-19 ENCOUNTER — ROUTINE PRENATAL (OUTPATIENT)
Dept: OBSTETRICS AND GYNECOLOGY | Facility: CLINIC | Age: 32
End: 2022-12-19
Payer: OTHER GOVERNMENT

## 2022-12-19 VITALS
DIASTOLIC BLOOD PRESSURE: 61 MMHG | SYSTOLIC BLOOD PRESSURE: 130 MMHG | HEART RATE: 92 BPM | WEIGHT: 177 LBS | BODY MASS INDEX: 32.37 KG/M2

## 2022-12-19 DIAGNOSIS — O34.219 PREVIOUS CESAREAN DELIVERY AFFECTING PREGNANCY, ANTEPARTUM: ICD-10-CM

## 2022-12-19 DIAGNOSIS — O28.3 ECHOGENIC INTRACARDIAC FOCUS OF FETUS ON PRENATAL ULTRASOUND: ICD-10-CM

## 2022-12-19 DIAGNOSIS — Z36.9 ANTENATAL SCREENING ENCOUNTER: Primary | ICD-10-CM

## 2022-12-19 DIAGNOSIS — N89.8 VAGINAL DISCHARGE DURING PREGNANCY IN THIRD TRIMESTER: ICD-10-CM

## 2022-12-19 DIAGNOSIS — Z3A.31 31 WEEKS GESTATION OF PREGNANCY: ICD-10-CM

## 2022-12-19 DIAGNOSIS — O26.893 VAGINAL DISCHARGE DURING PREGNANCY IN THIRD TRIMESTER: ICD-10-CM

## 2022-12-19 LAB
BILIRUB SERPL-MCNC: NORMAL MG/DL
BLOOD, POC UA: NORMAL
CANDIDA SPECIES: POSITIVE
GARDNERELLA: POSITIVE
GLUCOSE UR QL STRIP: NORMAL
KETONES UR QL STRIP: NORMAL
LEUKOCYTE ESTERASE URINE, POC: NORMAL
NITRITE, POC UA: NORMAL
PH, POC UA: 7.5
PROTEIN, POC: NORMAL
SPECIFIC GRAVITY, POC UA: 1.02
TRICHOMONAS: NEGATIVE
UROBILINOGEN, POC UA: 0.2

## 2022-12-19 PROCEDURE — 87480 CANDIDA DNA DIR PROBE: CPT | Mod: ,,, | Performed by: CLINICAL MEDICAL LABORATORY

## 2022-12-19 PROCEDURE — 87660 TRICHOMONAS VAGIN DIR PROBE: CPT | Mod: ,,, | Performed by: CLINICAL MEDICAL LABORATORY

## 2022-12-19 PROCEDURE — 87480 BACTERIAL VAGINOSIS: ICD-10-PCS | Mod: ,,, | Performed by: CLINICAL MEDICAL LABORATORY

## 2022-12-19 PROCEDURE — 87510 BACTERIAL VAGINOSIS: ICD-10-PCS | Mod: ,,, | Performed by: CLINICAL MEDICAL LABORATORY

## 2022-12-19 PROCEDURE — 87510 GARDNER VAG DNA DIR PROBE: CPT | Mod: ,,, | Performed by: CLINICAL MEDICAL LABORATORY

## 2022-12-19 PROCEDURE — 0502F SUBSEQUENT PRENATAL CARE: CPT | Mod: ,,, | Performed by: OBSTETRICS & GYNECOLOGY

## 2022-12-19 PROCEDURE — 87660 BACTERIAL VAGINOSIS: ICD-10-PCS | Mod: ,,, | Performed by: CLINICAL MEDICAL LABORATORY

## 2022-12-19 PROCEDURE — 0502F PR SUBSEQUENT PRENATAL CARE: ICD-10-PCS | Mod: ,,, | Performed by: OBSTETRICS & GYNECOLOGY

## 2022-12-19 NOTE — PROGRESS NOTES
32 y.o. female  at 31w3d   Reports fetal movement or fluttering. Denies any vaginal bleeding, leakage of fluid, cramping, contractions, or pressure.   She complains of vaginal discharge.  Pt states she is doing well without any concerns.     Vitals  BP: 130/61  Pulse: 92  Weight: 80.3 kg (177 lb)  Prenatal  Fundal Height (cm): 32 cm  Fetal Heart Rate: 140s  Movement: Present  Urine Albumin/Glucose  Urine Albumin: Negative  Urine Glucose: Negative  Edema  LLE Edema: None  RLE Edema: None  Facial: None  Additional Edema?: No    Prenatal Labs:  Lab Results   Component Value Date    GROUPTRH O POS 2022    HGB 9.5 (L) 2022    HCT 31.3 (L) 2022     2022    SICKLE Negative 2022    HEPBSAG Non-Reactive 2022    [id number] Non-Reactive 2022    LABNGO Negative 2022    LABURIN Skin/Urogenital Aisha Isolated, no further workup. 2022       The following were addressed during this visit:    29-32 Weeks  - Tdap Given   - Contraception/Tubal Consent   - Pre-registration   - Circumsision plans   - Op note review/ consent   - Birth Plan   - Pediatrician   - Fetal Kick Counts/PIH/PTL precautions   - Preeclampsia Education   - Quiet time       Daily fetal kick counts, bleeding, and  labor/labor precautions discussed.  Questions answered to desired level of satisfaction  Verbalized understanding to all information and instructions provided.    Total weight gain/weight loss in pregnancy: 5.443 kg (12 lb)     Follow up in about 2 weeks (around 2023) for NOLAN w/ .    A: 31w3d     ICD-10-CM ICD-9-CM    1.  screening encounter  Z36.9 V28.9 POCT Urinalysis      2. Echogenic intracardiac focus of fetus on prenatal ultrasound  O28.3 796.5       3. Previous  delivery affecting pregnancy, antepartum  O34.219 654.23       4. 31 weeks gestation of pregnancy  Z3A.31 V22.2 Bacterial Vaginosis      US OB 14+ Wks, TransAbd, Single Gestation      5.  Vaginal discharge during pregnancy in third trimester  O26.893 646.83 Bacterial Vaginosis    N89.8 623.5             Genie Lafleur M.D., FCOG    OB/GYN

## 2022-12-20 ENCOUNTER — PATIENT MESSAGE (OUTPATIENT)
Dept: OBSTETRICS AND GYNECOLOGY | Facility: CLINIC | Age: 32
End: 2022-12-20
Payer: OTHER GOVERNMENT

## 2022-12-20 DIAGNOSIS — B37.31 YEAST VAGINITIS: ICD-10-CM

## 2022-12-20 DIAGNOSIS — B96.89 BV (BACTERIAL VAGINOSIS): Primary | ICD-10-CM

## 2022-12-20 DIAGNOSIS — N76.0 BV (BACTERIAL VAGINOSIS): Primary | ICD-10-CM

## 2022-12-20 RX ORDER — METRONIDAZOLE 500 MG/1
500 TABLET ORAL 2 TIMES DAILY
Qty: 14 TABLET | Refills: 0 | Status: SHIPPED | OUTPATIENT
Start: 2022-12-20 | End: 2022-12-27

## 2022-12-20 RX ORDER — FLUCONAZOLE 150 MG/1
150 TABLET ORAL DAILY
Qty: 1 TABLET | Refills: 0 | Status: SHIPPED | OUTPATIENT
Start: 2022-12-20 | End: 2022-12-21

## 2023-01-03 ENCOUNTER — ROUTINE PRENATAL (OUTPATIENT)
Dept: OBSTETRICS AND GYNECOLOGY | Facility: CLINIC | Age: 33
End: 2023-01-03
Payer: OTHER GOVERNMENT

## 2023-01-03 ENCOUNTER — PROCEDURE VISIT (OUTPATIENT)
Dept: OBSTETRICS AND GYNECOLOGY | Facility: CLINIC | Age: 33
End: 2023-01-03
Attending: OBSTETRICS & GYNECOLOGY
Payer: OTHER GOVERNMENT

## 2023-01-03 VITALS
WEIGHT: 178.81 LBS | HEART RATE: 82 BPM | BODY MASS INDEX: 32.7 KG/M2 | DIASTOLIC BLOOD PRESSURE: 55 MMHG | SYSTOLIC BLOOD PRESSURE: 126 MMHG

## 2023-01-03 DIAGNOSIS — O34.219 PREVIOUS CESAREAN DELIVERY AFFECTING PREGNANCY, ANTEPARTUM: ICD-10-CM

## 2023-01-03 DIAGNOSIS — Z3A.33 33 WEEKS GESTATION OF PREGNANCY: Primary | ICD-10-CM

## 2023-01-03 DIAGNOSIS — O28.3 ECHOGENIC INTRACARDIAC FOCUS OF FETUS ON PRENATAL ULTRASOUND: ICD-10-CM

## 2023-01-03 DIAGNOSIS — Z36.9 ANTENATAL SCREENING ENCOUNTER: ICD-10-CM

## 2023-01-03 DIAGNOSIS — Z3A.31 31 WEEKS GESTATION OF PREGNANCY: ICD-10-CM

## 2023-01-03 LAB
BILIRUB SERPL-MCNC: NORMAL MG/DL
BLOOD, POC UA: NORMAL
GLUCOSE UR QL STRIP: NORMAL
KETONES UR QL STRIP: NORMAL
LEUKOCYTE ESTERASE URINE, POC: NORMAL
NITRITE, POC UA: NORMAL
PH, POC UA: 7
PROTEIN, POC: NORMAL
SPECIFIC GRAVITY, POC UA: 1.02
UROBILINOGEN, POC UA: 0.2

## 2023-01-03 PROCEDURE — 76819 FETAL BIOPHYS PROFIL W/O NST: CPT | Mod: ,,, | Performed by: OBSTETRICS & GYNECOLOGY

## 2023-01-03 PROCEDURE — 0502F SUBSEQUENT PRENATAL CARE: CPT | Mod: ,,, | Performed by: OBSTETRICS & GYNECOLOGY

## 2023-01-03 PROCEDURE — 99499 UNLISTED E&M SERVICE: CPT | Mod: ,,, | Performed by: OBSTETRICS & GYNECOLOGY

## 2023-01-03 PROCEDURE — 76805 PR US, OB 14+WKS, TRANSABD, SINGLE GESTATION: ICD-10-PCS | Mod: ,,, | Performed by: OBSTETRICS & GYNECOLOGY

## 2023-01-03 PROCEDURE — 0502F PR SUBSEQUENT PRENATAL CARE: ICD-10-PCS | Mod: ,,, | Performed by: OBSTETRICS & GYNECOLOGY

## 2023-01-03 PROCEDURE — 76805 OB US >/= 14 WKS SNGL FETUS: CPT | Mod: ,,, | Performed by: OBSTETRICS & GYNECOLOGY

## 2023-01-03 PROCEDURE — 76819 PR US, OB, FETAL BIOPHYSICAL, W/O NST: ICD-10-PCS | Mod: ,,, | Performed by: OBSTETRICS & GYNECOLOGY

## 2023-01-03 PROCEDURE — 99499 NO LOS: ICD-10-PCS | Mod: ,,, | Performed by: OBSTETRICS & GYNECOLOGY

## 2023-01-03 RX ORDER — BREAST PUMP
1 EACH MISCELLANEOUS DAILY PRN
Qty: 1 EACH | Refills: 0 | OUTPATIENT
Start: 2023-01-03 | End: 2024-01-03

## 2023-01-03 NOTE — PROGRESS NOTES
32 y.o. female  at 33w4d   Reports fetal movement or fluttering. Denies any vaginal bleeding, leakage of fluid, cramping, contractions, or pressure.   She complains of nothing.  Pt states she is doing well without any concerns.     Pt requests breast pump.    Vitals  BP: (!) 126/55  Pulse: 82  Weight: 81.1 kg (178 lb 12.8 oz)  Prenatal  Fundal Height (cm): 33 cm  Fetal Heart Rate: 140s  Movement: Present  Urine Albumin/Glucose  Urine Albumin: Negative  Urine Glucose: Negative  Edema  LLE Edema: None  RLE Edema: None  Facial: None  Additional Edema?: No    Prenatal Labs:  Lab Results   Component Value Date    GROUPTRH O POS 2022    HGB 9.5 (L) 2022    HCT 31.3 (L) 2022     2022    SICKLE Negative 2022    HEPBSAG Non-Reactive 2022    [id number] Non-Reactive 2022    LABNGO Negative 2022    LABURIN Skin/Urogenital Aisha Isolated, no further workup. 2022       The following were addressed during this visit:    33-36 Weeks  - Childbirth Education/Hospital Tours   - Breastfeeding   - Group B Strep Test/HIV/RPR   - Fetal Kick Counts/PIH/PTL precautions       Daily fetal kick counts, bleeding, and  labor/labor precautions discussed.  Questions answered to desired level of satisfaction  Verbalized understanding to all information and instructions provided.    Total weight gain/weight loss in pregnancy: 6.26 kg (13 lb 12.8 oz)     Follow up in about 2 weeks (around 2023) for NOLAN.    A: 33w4d     ICD-10-CM ICD-9-CM    1. 33 weeks gestation of pregnancy  Z3A.33 V22.2 POCT Urinalysis      2.  screening encounter  Z36.9 V28.9 POCT Urinalysis      3. Echogenic intracardiac focus of fetus on prenatal ultrasound  O28.3 796.5       4. Previous  delivery affecting pregnancy, antepartum  O34.219 654.23             Genie Lafleur M.D., FCOG    OB/GYN

## 2023-01-19 ENCOUNTER — ROUTINE PRENATAL (OUTPATIENT)
Dept: OBSTETRICS AND GYNECOLOGY | Facility: CLINIC | Age: 33
End: 2023-01-19
Payer: OTHER GOVERNMENT

## 2023-01-19 VITALS
WEIGHT: 180.81 LBS | HEART RATE: 94 BPM | BODY MASS INDEX: 33.07 KG/M2 | DIASTOLIC BLOOD PRESSURE: 70 MMHG | SYSTOLIC BLOOD PRESSURE: 121 MMHG

## 2023-01-19 DIAGNOSIS — Z36.85 ENCOUNTER FOR ANTENATAL SCREENING FOR STREPTOCOCCUS B: ICD-10-CM

## 2023-01-19 DIAGNOSIS — Z36.89 ENCOUNTER FOR OTHER SPECIFIED ANTENATAL SCREENING: ICD-10-CM

## 2023-01-19 DIAGNOSIS — Z36.9 ANTENATAL SCREENING ENCOUNTER: Primary | ICD-10-CM

## 2023-01-19 DIAGNOSIS — Z72.51 HIGH RISK SEXUAL BEHAVIOR, UNSPECIFIED TYPE: ICD-10-CM

## 2023-01-19 DIAGNOSIS — Z3A.35 35 WEEKS GESTATION OF PREGNANCY: ICD-10-CM

## 2023-01-19 DIAGNOSIS — Z11.3 SCREEN FOR STD (SEXUALLY TRANSMITTED DISEASE): ICD-10-CM

## 2023-01-19 DIAGNOSIS — O34.219 PATIENT DESIRES VAGINAL BIRTH AFTER CESAREAN SECTION (VBAC): ICD-10-CM

## 2023-01-19 LAB
ANISOCYTOSIS BLD QL SMEAR: NORMAL
BASOPHILS # BLD AUTO: 0.02 K/UL (ref 0–0.2)
BASOPHILS NFR BLD AUTO: 0.3 % (ref 0–1)
BILIRUB SERPL-MCNC: NORMAL MG/DL
BLOOD, POC UA: NORMAL
CANDIDA SPECIES: NEGATIVE
CTP QC/QA: YES
DIFFERENTIAL METHOD BLD: ABNORMAL
EOSINOPHIL # BLD AUTO: 0.28 K/UL (ref 0–0.5)
EOSINOPHIL NFR BLD AUTO: 3.5 % (ref 1–4)
ERYTHROCYTE [DISTWIDTH] IN BLOOD BY AUTOMATED COUNT: 16.2 % (ref 11.5–14.5)
GARDNERELLA: POSITIVE
GLUCOSE UR QL STRIP: NORMAL
HCT VFR BLD AUTO: 30 % (ref 38–47)
HGB BLD-MCNC: 9.3 G/DL (ref 12–16)
HYPOCHROMIA BLD QL SMEAR: NORMAL
IMM GRANULOCYTES # BLD AUTO: 0.02 K/UL (ref 0–0.04)
IMM GRANULOCYTES NFR BLD: 0.3 % (ref 0–0.4)
KETONES UR QL STRIP: NORMAL
LEUKOCYTE ESTERASE URINE, POC: NORMAL
LYMPHOCYTES # BLD AUTO: 1.71 K/UL (ref 1–4.8)
LYMPHOCYTES NFR BLD AUTO: 21.4 % (ref 27–41)
MCH RBC QN AUTO: 22.6 PG (ref 27–31)
MCHC RBC AUTO-ENTMCNC: 31 G/DL (ref 32–36)
MCV RBC AUTO: 72.8 FL (ref 80–96)
MICROCYTES BLD QL SMEAR: NORMAL
MONOCYTES # BLD AUTO: 0.47 K/UL (ref 0–0.8)
MONOCYTES NFR BLD AUTO: 5.9 % (ref 2–6)
MPC BLD CALC-MCNC: ABNORMAL G/DL
NEUTROPHILS # BLD AUTO: 5.49 K/UL (ref 1.8–7.7)
NEUTROPHILS NFR BLD AUTO: 68.6 % (ref 53–65)
NITRITE, POC UA: NORMAL
NRBC # BLD AUTO: 0 X10E3/UL
NRBC, AUTO (.00): 0 %
PH, POC UA: 6.5
PLATELET # BLD AUTO: 295 K/UL (ref 150–400)
PLATELET MORPHOLOGY: NORMAL
POC (AMP) AMPHETAMINE: NEGATIVE
POC (BAR) BARBITURATES: NEGATIVE
POC (BUP) BUPRENORPHINE: NEGATIVE
POC (BZO) BENZODIAZEPINES: NEGATIVE
POC (COC) COCAINE: NEGATIVE
POC (MDMA) METHYLENEDIOXYMETHAMPHETAMINE 3,4: NEGATIVE
POC (MET) METHAMPHETAMINE: NEGATIVE
POC (MOP) OPIATES: NEGATIVE
POC (MTD) METHADONE: NEGATIVE
POC (OXY) OXYCODONE: NEGATIVE
POC (PCP) PHENCYCLIDINE: NEGATIVE
POC (TCA) TRICYCLIC ANTIDEPRESSANTS: NEGATIVE
POC TEMPERATURE (URINE): 94
POC THC: NEGATIVE
PROTEIN, POC: NORMAL
RBC # BLD AUTO: 4.12 M/UL (ref 4.2–5.4)
SPECIFIC GRAVITY, POC UA: 1.02
SYPHILIS AB INTERPRETATION: NORMAL
TRICHOMONAS: NEGATIVE
UROBILINOGEN, POC UA: 1
WBC # BLD AUTO: 7.99 K/UL (ref 4.5–11)

## 2023-01-19 PROCEDURE — 87510 BACTERIAL VAGINOSIS: ICD-10-PCS | Mod: ,,, | Performed by: CLINICAL MEDICAL LABORATORY

## 2023-01-19 PROCEDURE — 87510 GARDNER VAG DNA DIR PROBE: CPT | Mod: ,,, | Performed by: CLINICAL MEDICAL LABORATORY

## 2023-01-19 PROCEDURE — 87653 STREP B DNA AMP PROBE: CPT | Mod: ,,, | Performed by: CLINICAL MEDICAL LABORATORY

## 2023-01-19 PROCEDURE — 85025 COMPLETE CBC W/AUTO DIFF WBC: CPT | Mod: ,,, | Performed by: CLINICAL MEDICAL LABORATORY

## 2023-01-19 PROCEDURE — 36415 PR COLLECTION VENOUS BLOOD,VENIPUNCTURE: ICD-10-PCS | Mod: ,,, | Performed by: OBSTETRICS & GYNECOLOGY

## 2023-01-19 PROCEDURE — 87660 BACTERIAL VAGINOSIS: ICD-10-PCS | Mod: ,,, | Performed by: CLINICAL MEDICAL LABORATORY

## 2023-01-19 PROCEDURE — 87653 STREP B SCREEN, ANTEPARTUM: ICD-10-PCS | Mod: ,,, | Performed by: CLINICAL MEDICAL LABORATORY

## 2023-01-19 PROCEDURE — 86780 TREPONEMA PALLIDUM (SYPHILIS) ANTIBODY: ICD-10-PCS | Mod: ,,, | Performed by: CLINICAL MEDICAL LABORATORY

## 2023-01-19 PROCEDURE — 86780 TREPONEMA PALLIDUM: CPT | Mod: ,,, | Performed by: CLINICAL MEDICAL LABORATORY

## 2023-01-19 PROCEDURE — 80305 DRUG TEST PRSMV DIR OPT OBS: CPT | Mod: QW,,, | Performed by: OBSTETRICS & GYNECOLOGY

## 2023-01-19 PROCEDURE — 80305 POCT URINE DRUG SCREEN PRESUMP: ICD-10-PCS | Mod: QW,,, | Performed by: OBSTETRICS & GYNECOLOGY

## 2023-01-19 PROCEDURE — 36415 COLL VENOUS BLD VENIPUNCTURE: CPT | Mod: ,,, | Performed by: OBSTETRICS & GYNECOLOGY

## 2023-01-19 PROCEDURE — 85025 CBC WITH DIFFERENTIAL: ICD-10-PCS | Mod: ,,, | Performed by: CLINICAL MEDICAL LABORATORY

## 2023-01-19 PROCEDURE — 87491 CHLMYD TRACH DNA AMP PROBE: CPT | Mod: ,,, | Performed by: CLINICAL MEDICAL LABORATORY

## 2023-01-19 PROCEDURE — 87591 N.GONORRHOEAE DNA AMP PROB: CPT | Mod: ,,, | Performed by: CLINICAL MEDICAL LABORATORY

## 2023-01-19 PROCEDURE — 87491 CHLAMYDIA/GONORRHOEAE(GC), PCR: ICD-10-PCS | Mod: ,,, | Performed by: CLINICAL MEDICAL LABORATORY

## 2023-01-19 PROCEDURE — 0502F SUBSEQUENT PRENATAL CARE: CPT | Mod: ,,, | Performed by: OBSTETRICS & GYNECOLOGY

## 2023-01-19 PROCEDURE — 87480 CANDIDA DNA DIR PROBE: CPT | Mod: ,,, | Performed by: CLINICAL MEDICAL LABORATORY

## 2023-01-19 PROCEDURE — 87480 BACTERIAL VAGINOSIS: ICD-10-PCS | Mod: ,,, | Performed by: CLINICAL MEDICAL LABORATORY

## 2023-01-19 PROCEDURE — 87591 CHLAMYDIA/GONORRHOEAE(GC), PCR: ICD-10-PCS | Mod: ,,, | Performed by: CLINICAL MEDICAL LABORATORY

## 2023-01-19 PROCEDURE — 0502F PR SUBSEQUENT PRENATAL CARE: ICD-10-PCS | Mod: ,,, | Performed by: OBSTETRICS & GYNECOLOGY

## 2023-01-19 PROCEDURE — 87660 TRICHOMONAS VAGIN DIR PROBE: CPT | Mod: ,,, | Performed by: CLINICAL MEDICAL LABORATORY

## 2023-01-19 NOTE — PROGRESS NOTES
32 y.o. female  at 35w6d   Reports fetal movement or fluttering. Denies any vaginal bleeding, leakage of fluid, cramping, contractions, or pressure.   She complains of contractions that are irregular in pattern.  Pt states she is doing well without any concerns.     Vitals  BP: 121/70  Pulse: 94  Weight: 82 kg (180 lb 12.8 oz)  Prenatal  Fundal Height (cm): 36 cm  Fetal Heart Rate: 140s  Movement: Present  Urine Albumin/Glucose  Urine Albumin: Trace  Urine Glucose: Negative  Edema  LLE Edema: None  RLE Edema: None  Facial: None  Additional Edema?: No  Cervical Exam  Dilation: 1  Effacement (%): 60  Station: -3  Presentation: Vertex  Station (Labor Curve): 8 cm  Dilation/Effacement/Station  Dilation: 1  Effacement (%): 60  Station: -3    Prenatal Labs:  Lab Results   Component Value Date    GROUPTRH O POS 2022    HGB 9.3 (L) 2023    HCT 30.0 (L) 2023     2023    SICKLE Negative 2022    HEPBSAG Non-Reactive 2022    [id number] Non-Reactive 2022    LABNGO Invalid 2023    LABURIN Skin/Urogenital Aisha Isolated, no further workup. 2022       No pregnancy checklist tasks were completed during this visit, and no tasks are pending completion.      Daily fetal kick counts, bleeding, and  labor/labor precautions discussed.  Questions answered to desired level of satisfaction  Verbalized understanding to all information and instructions provided.    Total weight gain/weight loss in pregnancy: 7.167 kg (15 lb 12.8 oz)     Follow up in about 1 week (around 2023) for NOLAN.    A: 35w6d     ICD-10-CM ICD-9-CM    1.  screening encounter  Z36.9 V28.9 POCT Urine Drug Screen Presump      CBC Auto Differential      Treponema Pallidum (Syphillis) Antibody      Chlamydia/GC, PCR      Bacterial Vaginosis      Strep B Screen, Antepartum      POCT Urinalysis      CBC Auto Differential      Treponema Pallidum (Syphillis) Antibody      US OB 14+ Weeks  TransAbd, w/Biophysical Profile, w/o NST, Single Gestation (xpd)      Chlamydia/GC, PCR      Bacterial Vaginosis      Strep B Screen, Antepartum      CBC Morphology      2. 35 weeks gestation of pregnancy  Z3A.35 V22.2 POCT Urine Drug Screen Presump      CBC Auto Differential      Treponema Pallidum (Syphillis) Antibody      Chlamydia/GC, PCR      Bacterial Vaginosis      Strep B Screen, Antepartum      POCT Urinalysis      CBC Auto Differential      Treponema Pallidum (Syphillis) Antibody      Chlamydia/GC, PCR      Bacterial Vaginosis      Strep B Screen, Antepartum      CBC Morphology      3. Screen for STD (sexually transmitted disease)  Z11.3 V74.5 Treponema Pallidum (Syphillis) Antibody      Chlamydia/GC, PCR      Bacterial Vaginosis      Treponema Pallidum (Syphillis) Antibody      Chlamydia/GC, PCR      Bacterial Vaginosis      4. High risk sexual behavior, unspecified type  Z72.51 V69.2 Treponema Pallidum (Syphillis) Antibody      Chlamydia/GC, PCR      Bacterial Vaginosis      Treponema Pallidum (Syphillis) Antibody      Chlamydia/GC, PCR      Bacterial Vaginosis      5. Encounter for other specified  screening  Z36.89 V28.9 POCT Urine Drug Screen Presump      6. Encounter for  screening for Streptococcus B  Z36.85 V28.6 Strep B Screen, Antepartum      Strep B Screen, Antepartum      7. Patient desires vaginal birth after  section ()  O34.219 654.20             Genie Lafleur M.D., FCOG    OB/GYN

## 2023-01-20 LAB — GROUP B STREP, PCR: NEGATIVE

## 2023-01-21 LAB
CHLAMYDIA BY PCR: NORMAL
N. GONORRHOEAE (GC) BY PCR: NORMAL

## 2023-01-26 ENCOUNTER — PROCEDURE VISIT (OUTPATIENT)
Dept: OBSTETRICS AND GYNECOLOGY | Facility: CLINIC | Age: 33
End: 2023-01-26
Attending: OBSTETRICS & GYNECOLOGY
Payer: OTHER GOVERNMENT

## 2023-01-26 ENCOUNTER — ROUTINE PRENATAL (OUTPATIENT)
Dept: OBSTETRICS AND GYNECOLOGY | Facility: CLINIC | Age: 33
End: 2023-01-26
Payer: OTHER GOVERNMENT

## 2023-01-26 VITALS
DIASTOLIC BLOOD PRESSURE: 64 MMHG | WEIGHT: 180.63 LBS | BODY MASS INDEX: 33.03 KG/M2 | SYSTOLIC BLOOD PRESSURE: 126 MMHG | HEART RATE: 81 BPM

## 2023-01-26 DIAGNOSIS — B96.89 BV (BACTERIAL VAGINOSIS): ICD-10-CM

## 2023-01-26 DIAGNOSIS — Z36.9 ANTENATAL SCREENING ENCOUNTER: ICD-10-CM

## 2023-01-26 DIAGNOSIS — Z3A.36 36 WEEKS GESTATION OF PREGNANCY: ICD-10-CM

## 2023-01-26 DIAGNOSIS — O34.219 PREVIOUS CESAREAN DELIVERY AFFECTING PREGNANCY, ANTEPARTUM: ICD-10-CM

## 2023-01-26 DIAGNOSIS — N76.0 BV (BACTERIAL VAGINOSIS): ICD-10-CM

## 2023-01-26 DIAGNOSIS — O28.3 ECHOGENIC INTRACARDIAC FOCUS OF FETUS ON PRENATAL ULTRASOUND: ICD-10-CM

## 2023-01-26 DIAGNOSIS — Z3A.36 36 WEEKS GESTATION OF PREGNANCY: Primary | ICD-10-CM

## 2023-01-26 DIAGNOSIS — O34.219 PATIENT DESIRES VAGINAL BIRTH AFTER CESAREAN SECTION (VBAC): ICD-10-CM

## 2023-01-26 DIAGNOSIS — Z36.9 ANTENATAL SCREENING ENCOUNTER: Primary | ICD-10-CM

## 2023-01-26 LAB
BILIRUB SERPL-MCNC: NORMAL MG/DL
BLOOD, POC UA: NORMAL
GLUCOSE UR QL STRIP: NORMAL
KETONES UR QL STRIP: NORMAL
LEUKOCYTE ESTERASE URINE, POC: NORMAL
NITRITE, POC UA: NORMAL
PH, POC UA: 6
PROTEIN, POC: NORMAL
SPECIFIC GRAVITY, POC UA: 1.02
UROBILINOGEN, POC UA: 0.2

## 2023-01-26 PROCEDURE — 0502F SUBSEQUENT PRENATAL CARE: CPT | Mod: ,,, | Performed by: OBSTETRICS & GYNECOLOGY

## 2023-01-26 PROCEDURE — 76805 PR US, OB 14+WKS, TRANSABD, SINGLE GESTATION: ICD-10-PCS | Mod: ,,, | Performed by: OBSTETRICS & GYNECOLOGY

## 2023-01-26 PROCEDURE — 99499 NO LOS: ICD-10-PCS | Mod: ,,, | Performed by: OBSTETRICS & GYNECOLOGY

## 2023-01-26 PROCEDURE — 0502F PR SUBSEQUENT PRENATAL CARE: ICD-10-PCS | Mod: ,,, | Performed by: OBSTETRICS & GYNECOLOGY

## 2023-01-26 PROCEDURE — 76805 OB US >/= 14 WKS SNGL FETUS: CPT | Mod: ,,, | Performed by: OBSTETRICS & GYNECOLOGY

## 2023-01-26 PROCEDURE — 76819 FETAL BIOPHYS PROFIL W/O NST: CPT | Mod: ,,, | Performed by: OBSTETRICS & GYNECOLOGY

## 2023-01-26 PROCEDURE — 76819 PR US, OB, FETAL BIOPHYSICAL, W/O NST: ICD-10-PCS | Mod: ,,, | Performed by: OBSTETRICS & GYNECOLOGY

## 2023-01-26 PROCEDURE — 99499 UNLISTED E&M SERVICE: CPT | Mod: ,,, | Performed by: OBSTETRICS & GYNECOLOGY

## 2023-01-26 RX ORDER — DICLOFENAC SODIUM 10 MG/G
GEL TOPICAL
COMMUNITY
Start: 2023-01-20

## 2023-01-26 RX ORDER — METRONIDAZOLE 500 MG/1
500 TABLET ORAL 2 TIMES DAILY
Qty: 14 TABLET | Refills: 0 | Status: SHIPPED | OUTPATIENT
Start: 2023-01-26 | End: 2023-02-02

## 2023-01-26 NOTE — PROGRESS NOTES
32 y.o. female  at 36w6d   Reports fetal movement or fluttering. Denies any vaginal bleeding, leakage of fluid, cramping, contractions, or pressure.   She complains of nothing.  Pt states she is doing well without any concerns.     Vitals  BP: 126/64  Pulse: 81  Weight: 81.9 kg (180 lb 9.6 oz)  Prenatal  Fundal Height (cm): 38 cm  Fetal Heart Rate: 130s  Movement: Present  Urine Albumin/Glucose  Urine Albumin: Negative  Urine Glucose: Negative  Edema  LLE Edema: None  RLE Edema: None  Facial: None  Additional Edema?: No  Cervical Exam  Dilation: 1  Effacement (%): 60  Station: Ballotable  Presentation: Vertex  Station (Labor Curve): 9 cm  Dilation/Effacement/Station  Dilation: 1  Effacement (%): 60  Station: Ballotable    Prenatal Labs:  Lab Results   Component Value Date    GROUPTRH O POS 2022    HGB 9.3 (L) 2023    HCT 30.0 (L) 2023     2023    SICKLE Negative 2022    HEPBSAG Non-Reactive 2022    [id number] Non-Reactive 2022    LABNGO Invalid 2023    LABURIN Skin/Urogenital Aisha Isolated, no further workup. 2022       No pregnancy checklist tasks were completed during this visit, and no tasks are pending completion.    Pt is to go to [facility] for US on 2023 to make sure baby is practicing breathing.    Daily fetal kick counts, bleeding, and  labor/labor precautions discussed.  Questions answered to desired level of satisfaction  Verbalized understanding to all information and instructions provided.    Total weight gain/weight loss in pregnancy: 7.076 kg (15 lb 9.6 oz)     Follow up in about 1 week (around 2023) for NOLAN.    A: 36w6d     ICD-10-CM ICD-9-CM    1.  screening encounter  Z36.9 V28.9 POCT Urinalysis      2. 36 weeks gestation of pregnancy  Z3A.36 V22.2 POCT Urinalysis      US OB Limited with Biophysical Profile (xpd)      3. BV (bacterial vaginosis)  N76.0 616.10 metroNIDAZOLE (FLAGYL) 500 MG tablet     B96.89 041.9       4. Patient desires vaginal birth after  section ()  O34.219 654.20 US OB Limited with Biophysical Profile (xpd)      5. Previous  delivery affecting pregnancy, antepartum  O34.219 654.23 US OB Limited with Biophysical Profile (xpd)      6. Echogenic intracardiac focus of fetus on prenatal ultrasound  O28.3 796.5 US OB Limited with Biophysical Profile (xpd)            Genie Lafleur M.D., FCOG    OB/GYN

## 2023-01-27 ENCOUNTER — HOSPITAL ENCOUNTER (OUTPATIENT)
Facility: HOSPITAL | Age: 33
Discharge: HOME | End: 2023-01-27
Attending: SPECIALIST | Admitting: SPECIALIST
Payer: OTHER GOVERNMENT

## 2023-01-27 VITALS
HEIGHT: 62 IN | TEMPERATURE: 97 F | RESPIRATION RATE: 20 BRPM | HEART RATE: 87 BPM | SYSTOLIC BLOOD PRESSURE: 127 MMHG | WEIGHT: 181 LBS | DIASTOLIC BLOOD PRESSURE: 56 MMHG | BODY MASS INDEX: 33.31 KG/M2 | OXYGEN SATURATION: 98 %

## 2023-01-27 DIAGNOSIS — Z34.90 PREGNANT: ICD-10-CM

## 2023-01-27 PROCEDURE — 99211 OFF/OP EST MAY X REQ PHY/QHP: CPT | Mod: 25

## 2023-02-02 ENCOUNTER — ROUTINE PRENATAL (OUTPATIENT)
Dept: OBSTETRICS AND GYNECOLOGY | Facility: CLINIC | Age: 33
End: 2023-02-02
Payer: OTHER GOVERNMENT

## 2023-02-02 ENCOUNTER — PROCEDURE VISIT (OUTPATIENT)
Dept: OBSTETRICS AND GYNECOLOGY | Facility: CLINIC | Age: 33
End: 2023-02-02
Attending: OBSTETRICS & GYNECOLOGY
Payer: OTHER GOVERNMENT

## 2023-02-02 VITALS
WEIGHT: 184.81 LBS | BODY MASS INDEX: 33.8 KG/M2 | HEART RATE: 88 BPM | DIASTOLIC BLOOD PRESSURE: 59 MMHG | SYSTOLIC BLOOD PRESSURE: 122 MMHG

## 2023-02-02 DIAGNOSIS — O34.219 PREVIOUS CESAREAN DELIVERY AFFECTING PREGNANCY, ANTEPARTUM: ICD-10-CM

## 2023-02-02 DIAGNOSIS — O34.219 PATIENT DESIRES VAGINAL BIRTH AFTER CESAREAN SECTION (VBAC): ICD-10-CM

## 2023-02-02 DIAGNOSIS — Z3A.37 37 WEEKS GESTATION OF PREGNANCY: ICD-10-CM

## 2023-02-02 DIAGNOSIS — O28.3 ECHOGENIC INTRACARDIAC FOCUS OF FETUS ON PRENATAL ULTRASOUND: ICD-10-CM

## 2023-02-02 DIAGNOSIS — Z36.9 ANTENATAL SCREENING ENCOUNTER: Primary | ICD-10-CM

## 2023-02-02 PROCEDURE — 0502F PR SUBSEQUENT PRENATAL CARE: ICD-10-PCS | Mod: ,,, | Performed by: OBSTETRICS & GYNECOLOGY

## 2023-02-02 PROCEDURE — 99499 NO LOS: ICD-10-PCS | Mod: ,,, | Performed by: OBSTETRICS & GYNECOLOGY

## 2023-02-02 PROCEDURE — 76819 PR US, OB, FETAL BIOPHYSICAL, W/O NST: ICD-10-PCS | Mod: ,,, | Performed by: OBSTETRICS & GYNECOLOGY

## 2023-02-02 PROCEDURE — 99499 UNLISTED E&M SERVICE: CPT | Mod: ,,, | Performed by: OBSTETRICS & GYNECOLOGY

## 2023-02-02 PROCEDURE — 76819 FETAL BIOPHYS PROFIL W/O NST: CPT | Mod: ,,, | Performed by: OBSTETRICS & GYNECOLOGY

## 2023-02-02 PROCEDURE — 0502F SUBSEQUENT PRENATAL CARE: CPT | Mod: ,,, | Performed by: OBSTETRICS & GYNECOLOGY

## 2023-02-02 PROCEDURE — 76805 PR US, OB 14+WKS, TRANSABD, SINGLE GESTATION: ICD-10-PCS | Mod: ,,, | Performed by: OBSTETRICS & GYNECOLOGY

## 2023-02-02 PROCEDURE — 76805 OB US >/= 14 WKS SNGL FETUS: CPT | Mod: ,,, | Performed by: OBSTETRICS & GYNECOLOGY

## 2023-02-02 NOTE — PROGRESS NOTES
32 y.o. female  at 37w6d   Reports fetal movement or fluttering. Denies any vaginal bleeding, leakage of fluid, cramping, contractions, or pressure.   She complains of vaginal pain that comes and goes in a irregular pattern.  Pt states she is doing well without any concerns.     Vitals  BP: (!) 122/59  Pulse: 88  Weight: 83.8 kg (184 lb 12.8 oz)  Prenatal  Fundal Height (cm): 37 cm  Fetal Heart Rate: 140s  Movement: Present  Urine Albumin/Glucose  Urine Albumin: Trace  Urine Glucose: Negative  Edema  LLE Edema: None  RLE Edema: None  Facial: None  Additional Edema?: No  Cervical Exam  Dilation: 1  Effacement (%): 60  Station: Ballotable  Presentation: Vertex  Station (Labor Curve): 9 cm  Dilation/Effacement/Station  Dilation: 1  Effacement (%): 60  Station: Ballotable    Prenatal Labs:  Lab Results   Component Value Date    GROUPTRH O POS 2022    HGB 9.3 (L) 2023    HCT 30.0 (L) 2023     2023    SICKLE Negative 2022    HEPBSAG Non-Reactive 2022    [id number] Non-Reactive 2022    LABNGO Invalid 2023    LABURIN Skin/Urogenital Aisha Isolated, no further workup. 2022       The following were addressed during this visit:    37-41 Weeks  - Postpartum Immunizations   - Hospital Stay Expectations   - When to go to the hospital   - Fetal kick counts/PIH/Bleeding/ROM/Labor precautions   - Labor induction policy   - Cervical ripening   - Breastfeeding review: benefits of breastfeeding, early skin to skin, 24/7 rooming in, exclusive breastfeeding for 6 months       Daily fetal kick counts, bleeding, and  labor/labor precautions discussed.  Questions answered to desired level of satisfaction  Verbalized understanding to all information and instructions provided.    Total weight gain/weight loss in pregnancy: 8.981 kg (19 lb 12.8 oz)     Follow up in about 1 week (around 2023) for NOLAN.    A: 37w6d     ICD-10-CM ICD-9-CM    1.  screening  encounter  Z36.9 V28.9 POCT Urinalysis      2. 37 weeks gestation of pregnancy  Z3A.37 V22.2 POCT Urinalysis      US OB 14+ Weeks TransAbd, w/Biophysical Profile, w/o NST, Single Gestation (xpd)      3. Patient desires vaginal birth after  section ()  O34.219 654.20       4. Previous  delivery affecting pregnancy, antepartum  O34.219 654.23       5. Echogenic intracardiac focus of fetus on prenatal ultrasound  O28.3 796.5 US OB 14+ Weeks TransAbd, w/Biophysical Profile, w/o NST, Single Gestation (xpd)            Genie Lafleur M.D., FCOG    OB/GYN

## 2023-02-09 ENCOUNTER — ANESTHESIA (OUTPATIENT)
Dept: OBSTETRICS AND GYNECOLOGY | Facility: HOSPITAL | Age: 33
End: 2023-02-09
Payer: OTHER GOVERNMENT

## 2023-02-09 ENCOUNTER — ROUTINE PRENATAL (OUTPATIENT)
Dept: OBSTETRICS AND GYNECOLOGY | Facility: CLINIC | Age: 33
End: 2023-02-09
Payer: OTHER GOVERNMENT

## 2023-02-09 ENCOUNTER — HOSPITAL ENCOUNTER (INPATIENT)
Facility: HOSPITAL | Age: 33
LOS: 2 days | Discharge: HOME | End: 2023-02-12
Attending: OBSTETRICS & GYNECOLOGY | Admitting: OBSTETRICS & GYNECOLOGY
Payer: OTHER GOVERNMENT

## 2023-02-09 ENCOUNTER — PROCEDURE VISIT (OUTPATIENT)
Dept: OBSTETRICS AND GYNECOLOGY | Facility: CLINIC | Age: 33
End: 2023-02-09
Attending: OBSTETRICS & GYNECOLOGY
Payer: OTHER GOVERNMENT

## 2023-02-09 VITALS
WEIGHT: 184.19 LBS | BODY MASS INDEX: 33.69 KG/M2 | HEART RATE: 67 BPM | SYSTOLIC BLOOD PRESSURE: 119 MMHG | DIASTOLIC BLOOD PRESSURE: 61 MMHG

## 2023-02-09 DIAGNOSIS — Z3A.38 38 WEEKS GESTATION OF PREGNANCY: ICD-10-CM

## 2023-02-09 DIAGNOSIS — O34.219 PREVIOUS CESAREAN DELIVERY AFFECTING PREGNANCY, ANTEPARTUM: ICD-10-CM

## 2023-02-09 DIAGNOSIS — O42.90 DELAYED DELIVERY AFTER SROM (SPONTANEOUS RUPTURE OF MEMBRANES): Primary | ICD-10-CM

## 2023-02-09 DIAGNOSIS — O34.219 PATIENT DESIRES VAGINAL BIRTH AFTER CESAREAN SECTION (VBAC): ICD-10-CM

## 2023-02-09 DIAGNOSIS — O34.219 VBAC, DELIVERED, CURRENT HOSPITALIZATION: ICD-10-CM

## 2023-02-09 DIAGNOSIS — Z34.90 PREGNANT AND NOT YET DELIVERED: ICD-10-CM

## 2023-02-09 DIAGNOSIS — O28.3 ECHOGENIC INTRACARDIAC FOCUS OF FETUS ON PRENATAL ULTRASOUND: ICD-10-CM

## 2023-02-09 DIAGNOSIS — Z36.9 ANTENATAL SCREENING ENCOUNTER: Primary | ICD-10-CM

## 2023-02-09 PROBLEM — O32.4XX0 FAILURE OF FETAL HEAD TO ENTER PELVIC BRIM: Status: ACTIVE | Noted: 2023-02-09

## 2023-02-09 LAB
ANISOCYTOSIS BLD QL SMEAR: ABNORMAL
BACTERIA #/AREA URNS HPF: ABNORMAL /HPF
BASOPHILS # BLD AUTO: 0.02 K/UL (ref 0–0.2)
BASOPHILS NFR BLD AUTO: 0.2 % (ref 0–1)
BILIRUB SERPL-MCNC: NORMAL MG/DL
BILIRUB UR QL STRIP: NEGATIVE
BLOOD, POC UA: NORMAL
CLARITY UR: ABNORMAL
COLOR UR: COLORLESS
DIFFERENTIAL METHOD BLD: ABNORMAL
EOSINOPHIL # BLD AUTO: 0.09 K/UL (ref 0–0.5)
EOSINOPHIL NFR BLD AUTO: 1 % (ref 1–4)
ERYTHROCYTE [DISTWIDTH] IN BLOOD BY AUTOMATED COUNT: 17.4 % (ref 11.5–14.5)
GLUCOSE UR QL STRIP: NORMAL
GLUCOSE UR STRIP-MCNC: NORMAL MG/DL
HBV SURFACE AG SERPL QL IA: NORMAL
HCT VFR BLD AUTO: 31.6 % (ref 38–47)
HGB BLD-MCNC: 9.9 G/DL (ref 12–16)
HIV 1+O+2 AB SERPL QL: NORMAL
HYPOCHROMIA BLD QL SMEAR: ABNORMAL
IMM GRANULOCYTES # BLD AUTO: 0.04 K/UL (ref 0–0.04)
IMM GRANULOCYTES NFR BLD: 0.5 % (ref 0–0.4)
INDIRECT COOMBS: NORMAL
KETONES UR QL STRIP: NORMAL
KETONES UR STRIP-SCNC: NEGATIVE MG/DL
LEUKOCYTE ESTERASE UR QL STRIP: ABNORMAL
LEUKOCYTE ESTERASE URINE, POC: NORMAL
LYMPHOCYTES # BLD AUTO: 2.32 K/UL (ref 1–4.8)
LYMPHOCYTES NFR BLD AUTO: 26.5 % (ref 27–41)
MCH RBC QN AUTO: 22.6 PG (ref 27–31)
MCHC RBC AUTO-ENTMCNC: 31.3 G/DL (ref 32–36)
MCV RBC AUTO: 72 FL (ref 80–96)
MICROCYTES BLD QL SMEAR: ABNORMAL
MONOCYTES # BLD AUTO: 0.45 K/UL (ref 0–0.8)
MONOCYTES NFR BLD AUTO: 5.1 % (ref 2–6)
MPC BLD CALC-MCNC: ABNORMAL G/DL
MUCOUS THREADS #/AREA URNS HPF: ABNORMAL /HPF
NEUTROPHILS # BLD AUTO: 5.82 K/UL (ref 1.8–7.7)
NEUTROPHILS NFR BLD AUTO: 66.7 % (ref 53–65)
NITRITE UR QL STRIP: NEGATIVE
NITRITE, POC UA: NORMAL
NRBC # BLD AUTO: 0 X10E3/UL
NRBC, AUTO (.00): 0 %
OVALOCYTES BLD QL SMEAR: ABNORMAL
PH UR STRIP: 7.5 PH UNITS
PH, POC UA: 8
PLATELET # BLD AUTO: 237 K/UL (ref 150–400)
PLATELET MORPHOLOGY: ABNORMAL
POLYCHROMASIA BLD QL SMEAR: ABNORMAL
PROT UR QL STRIP: NEGATIVE
PROTEIN, POC: NORMAL
RBC # BLD AUTO: 4.39 M/UL (ref 4.2–5.4)
RBC # UR STRIP: NEGATIVE /UL
RBC #/AREA URNS HPF: ABNORMAL /HPF
RH BLD: NORMAL
SP GR UR STRIP: 1
SPECIFIC GRAVITY, POC UA: 1.02
SQUAMOUS #/AREA URNS LPF: ABNORMAL /LPF
SYPHILIS AB INTERPRETATION: NORMAL
TARGETS BLD QL SMEAR: ABNORMAL
TRICHOMONAS #/AREA URNS HPF: ABNORMAL /HPF
UROBILINOGEN UR STRIP-ACNC: NORMAL MG/DL
UROBILINOGEN, POC UA: 0.2
WBC # BLD AUTO: 8.74 K/UL (ref 4.5–11)
WBC #/AREA URNS HPF: ABNORMAL /HPF
YEAST #/AREA URNS HPF: ABNORMAL /HPF

## 2023-02-09 PROCEDURE — 81001 URINALYSIS AUTO W/SCOPE: CPT | Performed by: OBSTETRICS & GYNECOLOGY

## 2023-02-09 PROCEDURE — 87086 URINE CULTURE/COLONY COUNT: CPT | Performed by: OBSTETRICS & GYNECOLOGY

## 2023-02-09 PROCEDURE — 25000003 PHARM REV CODE 250: Performed by: ANESTHESIOLOGY

## 2023-02-09 PROCEDURE — 63600175 PHARM REV CODE 636 W HCPCS: Performed by: ANESTHESIOLOGY

## 2023-02-09 PROCEDURE — 99499 NO LOS: ICD-10-PCS | Mod: ,,, | Performed by: OBSTETRICS & GYNECOLOGY

## 2023-02-09 PROCEDURE — 76819 FETAL BIOPHYS PROFIL W/O NST: CPT | Mod: ,,, | Performed by: OBSTETRICS & GYNECOLOGY

## 2023-02-09 PROCEDURE — 76805 PR US, OB 14+WKS, TRANSABD, SINGLE GESTATION: ICD-10-PCS | Mod: ,,, | Performed by: OBSTETRICS & GYNECOLOGY

## 2023-02-09 PROCEDURE — 0502F SUBSEQUENT PRENATAL CARE: CPT | Mod: ,,, | Performed by: OBSTETRICS & GYNECOLOGY

## 2023-02-09 PROCEDURE — 76805 OB US >/= 14 WKS SNGL FETUS: CPT | Mod: ,,, | Performed by: OBSTETRICS & GYNECOLOGY

## 2023-02-09 PROCEDURE — 86900 BLOOD TYPING SEROLOGIC ABO: CPT | Performed by: OBSTETRICS & GYNECOLOGY

## 2023-02-09 PROCEDURE — C1751 CATH, INF, PER/CENT/MIDLINE: HCPCS | Performed by: ANESTHESIOLOGY

## 2023-02-09 PROCEDURE — 85025 COMPLETE CBC W/AUTO DIFF WBC: CPT | Performed by: OBSTETRICS & GYNECOLOGY

## 2023-02-09 PROCEDURE — 59410 OBSTETRICAL CARE: CPT | Mod: ,,, | Performed by: ANESTHESIOLOGY

## 2023-02-09 PROCEDURE — 0502F PR SUBSEQUENT PRENATAL CARE: ICD-10-PCS | Mod: ,,, | Performed by: OBSTETRICS & GYNECOLOGY

## 2023-02-09 PROCEDURE — 87389 HIV-1 AG W/HIV-1&-2 AB AG IA: CPT | Performed by: OBSTETRICS & GYNECOLOGY

## 2023-02-09 PROCEDURE — 76819 PR US, OB, FETAL BIOPHYSICAL, W/O NST: ICD-10-PCS | Mod: ,,, | Performed by: OBSTETRICS & GYNECOLOGY

## 2023-02-09 PROCEDURE — 86780 TREPONEMA PALLIDUM: CPT | Performed by: OBSTETRICS & GYNECOLOGY

## 2023-02-09 PROCEDURE — 63600175 PHARM REV CODE 636 W HCPCS: Performed by: OBSTETRICS & GYNECOLOGY

## 2023-02-09 PROCEDURE — 25000003 PHARM REV CODE 250: Performed by: OBSTETRICS & GYNECOLOGY

## 2023-02-09 PROCEDURE — 62326 NJX INTERLAMINAR LMBR/SAC: CPT | Mod: AA | Performed by: ANESTHESIOLOGY

## 2023-02-09 PROCEDURE — 87340 HEPATITIS B SURFACE AG IA: CPT | Performed by: OBSTETRICS & GYNECOLOGY

## 2023-02-09 PROCEDURE — 99499 UNLISTED E&M SERVICE: CPT | Mod: ,,, | Performed by: OBSTETRICS & GYNECOLOGY

## 2023-02-09 PROCEDURE — 59410 PRA OBSTE CARE,VAG DELIV+POSTPARTUM: ICD-10-PCS | Mod: ,,, | Performed by: ANESTHESIOLOGY

## 2023-02-09 RX ORDER — OXYCODONE HYDROCHLORIDE 5 MG/1
10 TABLET ORAL EVERY 4 HOURS PRN
Status: CANCELLED | OUTPATIENT
Start: 2023-02-09 | End: 2023-02-10

## 2023-02-09 RX ORDER — ACETAMINOPHEN 325 MG/1
650 TABLET ORAL EVERY 6 HOURS
Status: CANCELLED | OUTPATIENT
Start: 2023-02-10 | End: 2023-02-11

## 2023-02-09 RX ORDER — METHYLERGONOVINE MALEATE 0.2 MG/ML
200 INJECTION INTRAVENOUS
Status: DISCONTINUED | OUTPATIENT
Start: 2023-02-09 | End: 2023-02-10

## 2023-02-09 RX ORDER — CARBOPROST TROMETHAMINE 250 UG/ML
250 INJECTION, SOLUTION INTRAMUSCULAR
Status: DISCONTINUED | OUTPATIENT
Start: 2023-02-09 | End: 2023-02-10

## 2023-02-09 RX ORDER — MISOPROSTOL 200 UG/1
800 TABLET ORAL ONCE AS NEEDED
Status: DISCONTINUED | OUTPATIENT
Start: 2023-02-09 | End: 2023-02-12 | Stop reason: HOSPADM

## 2023-02-09 RX ORDER — OXYTOCIN/RINGER'S LACTATE 30/500 ML
95 PLASTIC BAG, INJECTION (ML) INTRAVENOUS ONCE AS NEEDED
Status: DISCONTINUED | OUTPATIENT
Start: 2023-02-09 | End: 2023-02-10

## 2023-02-09 RX ORDER — OXYTOCIN 10 [USP'U]/ML
10 INJECTION, SOLUTION INTRAMUSCULAR; INTRAVENOUS ONCE AS NEEDED
Status: DISCONTINUED | OUTPATIENT
Start: 2023-02-09 | End: 2023-02-12 | Stop reason: HOSPADM

## 2023-02-09 RX ORDER — CALCIUM CARBONATE 200(500)MG
500 TABLET,CHEWABLE ORAL 3 TIMES DAILY PRN
Status: DISCONTINUED | OUTPATIENT
Start: 2023-02-09 | End: 2023-02-12 | Stop reason: HOSPADM

## 2023-02-09 RX ORDER — LIDOCAINE HYDROCHLORIDE 20 MG/ML
10 INJECTION, SOLUTION EPIDURAL; INFILTRATION; INTRACAUDAL; PERINEURAL ONCE
Status: DISCONTINUED | OUTPATIENT
Start: 2023-02-09 | End: 2023-02-12 | Stop reason: HOSPADM

## 2023-02-09 RX ORDER — OXYTOCIN/RINGER'S LACTATE 30/500 ML
95 PLASTIC BAG, INJECTION (ML) INTRAVENOUS ONCE
Status: DISCONTINUED | OUTPATIENT
Start: 2023-02-09 | End: 2023-02-10

## 2023-02-09 RX ORDER — ONDANSETRON 2 MG/ML
4 INJECTION INTRAMUSCULAR; INTRAVENOUS EVERY 6 HOURS PRN
Status: CANCELLED | OUTPATIENT
Start: 2023-02-09 | End: 2023-02-10

## 2023-02-09 RX ORDER — EPHEDRINE SULFATE 50 MG/ML
10 INJECTION, SOLUTION INTRAVENOUS ONCE
Status: COMPLETED | OUTPATIENT
Start: 2023-02-09 | End: 2023-02-10

## 2023-02-09 RX ORDER — OXYTOCIN/RINGER'S LACTATE 30/500 ML
334 PLASTIC BAG, INJECTION (ML) INTRAVENOUS ONCE
Status: DISCONTINUED | OUTPATIENT
Start: 2023-02-09 | End: 2023-02-10

## 2023-02-09 RX ORDER — OXYTOCIN/RINGER'S LACTATE 30/500 ML
334 PLASTIC BAG, INJECTION (ML) INTRAVENOUS ONCE AS NEEDED
Status: COMPLETED | OUTPATIENT
Start: 2023-02-09 | End: 2023-02-10

## 2023-02-09 RX ORDER — SIMETHICONE 80 MG
1 TABLET,CHEWABLE ORAL 4 TIMES DAILY PRN
Status: DISCONTINUED | OUTPATIENT
Start: 2023-02-09 | End: 2023-02-10

## 2023-02-09 RX ORDER — TRANEXAMIC ACID 10 MG/ML
1000 INJECTION, SOLUTION INTRAVENOUS ONCE AS NEEDED
Status: DISCONTINUED | OUTPATIENT
Start: 2023-02-09 | End: 2023-02-12 | Stop reason: HOSPADM

## 2023-02-09 RX ORDER — LIDOCAINE HYDROCHLORIDE 10 MG/ML
10 INJECTION INFILTRATION; PERINEURAL ONCE AS NEEDED
Status: DISCONTINUED | OUTPATIENT
Start: 2023-02-09 | End: 2023-02-12 | Stop reason: HOSPADM

## 2023-02-09 RX ORDER — SODIUM CHLORIDE 9 MG/ML
INJECTION, SOLUTION INTRAVENOUS
Status: DISCONTINUED | OUTPATIENT
Start: 2023-02-09 | End: 2023-02-10

## 2023-02-09 RX ORDER — SODIUM CHLORIDE, SODIUM LACTATE, POTASSIUM CHLORIDE, CALCIUM CHLORIDE 600; 310; 30; 20 MG/100ML; MG/100ML; MG/100ML; MG/100ML
INJECTION, SOLUTION INTRAVENOUS CONTINUOUS
Status: DISCONTINUED | OUTPATIENT
Start: 2023-02-09 | End: 2023-02-10

## 2023-02-09 RX ORDER — BUTORPHANOL TARTRATE 1 MG/ML
1 INJECTION INTRAMUSCULAR; INTRAVENOUS
Status: DISCONTINUED | OUTPATIENT
Start: 2023-02-09 | End: 2023-02-10

## 2023-02-09 RX ORDER — ACETAMINOPHEN 325 MG/1
650 TABLET ORAL EVERY 6 HOURS PRN
Status: DISCONTINUED | OUTPATIENT
Start: 2023-02-09 | End: 2023-02-10

## 2023-02-09 RX ORDER — BUTORPHANOL TARTRATE 1 MG/ML
2 INJECTION INTRAMUSCULAR; INTRAVENOUS
Status: DISCONTINUED | OUTPATIENT
Start: 2023-02-09 | End: 2023-02-12 | Stop reason: HOSPADM

## 2023-02-09 RX ORDER — DIPHENOXYLATE HYDROCHLORIDE AND ATROPINE SULFATE 2.5; .025 MG/1; MG/1
1 TABLET ORAL 4 TIMES DAILY PRN
Status: DISCONTINUED | OUTPATIENT
Start: 2023-02-09 | End: 2023-02-12 | Stop reason: HOSPADM

## 2023-02-09 RX ORDER — LIDOCAINE HYDROCHLORIDE 20 MG/ML
INJECTION, SOLUTION EPIDURAL; INFILTRATION; INTRACAUDAL; PERINEURAL
Status: COMPLETED | OUTPATIENT
Start: 2023-02-09 | End: 2023-02-09

## 2023-02-09 RX ORDER — KETOROLAC TROMETHAMINE 30 MG/ML
30 INJECTION, SOLUTION INTRAMUSCULAR; INTRAVENOUS EVERY 6 HOURS
Status: CANCELLED | OUTPATIENT
Start: 2023-02-10 | End: 2023-02-11

## 2023-02-09 RX ORDER — FENTANYL/ROPIVACAINE/NS/PF 2MCG/ML-.2
PLASTIC BAG, INJECTION (ML) INJECTION CONTINUOUS
Status: DISCONTINUED | OUTPATIENT
Start: 2023-02-09 | End: 2023-02-12 | Stop reason: HOSPADM

## 2023-02-09 RX ORDER — ONDANSETRON 4 MG/1
8 TABLET, ORALLY DISINTEGRATING ORAL EVERY 8 HOURS PRN
Status: DISCONTINUED | OUTPATIENT
Start: 2023-02-09 | End: 2023-02-10

## 2023-02-09 RX ORDER — MISOPROSTOL 200 UG/1
800 TABLET ORAL ONCE AS NEEDED
Status: DISCONTINUED | OUTPATIENT
Start: 2023-02-09 | End: 2023-02-10

## 2023-02-09 RX ORDER — ONDANSETRON 2 MG/ML
4 INJECTION INTRAMUSCULAR; INTRAVENOUS EVERY 6 HOURS PRN
Status: DISCONTINUED | OUTPATIENT
Start: 2023-02-09 | End: 2023-02-12 | Stop reason: HOSPADM

## 2023-02-09 RX ORDER — PROCHLORPERAZINE EDISYLATE 5 MG/ML
5 INJECTION INTRAMUSCULAR; INTRAVENOUS EVERY 6 HOURS PRN
Status: DISCONTINUED | OUTPATIENT
Start: 2023-02-09 | End: 2023-02-10

## 2023-02-09 RX ORDER — OXYCODONE HYDROCHLORIDE 5 MG/1
5 TABLET ORAL EVERY 4 HOURS PRN
Status: CANCELLED | OUTPATIENT
Start: 2023-02-09 | End: 2023-02-10

## 2023-02-09 RX ADMIN — SODIUM CHLORIDE, POTASSIUM CHLORIDE, SODIUM LACTATE AND CALCIUM CHLORIDE: 600; 310; 30; 20 INJECTION, SOLUTION INTRAVENOUS at 09:02

## 2023-02-09 RX ADMIN — ROPIVACAINE HYDROCHLORIDE 500 MCG: 10 INJECTION, SOLUTION EPIDURAL at 10:02

## 2023-02-09 RX ADMIN — LIDOCAINE HYDROCHLORIDE 10 ML: 20 INJECTION, SOLUTION INTRAVENOUS at 10:02

## 2023-02-09 RX ADMIN — ROPIVACAINE HYDROCHLORIDE 12 ML/HR: 10 INJECTION, SOLUTION EPIDURAL at 10:02

## 2023-02-09 RX ADMIN — CALCIUM CARBONATE (ANTACID) CHEW TAB 500 MG 500 MG: 500 CHEW TAB at 11:02

## 2023-02-09 RX ADMIN — SODIUM CHLORIDE, POTASSIUM CHLORIDE, SODIUM LACTATE AND CALCIUM CHLORIDE 1000 ML: 600; 310; 30; 20 INJECTION, SOLUTION INTRAVENOUS at 10:02

## 2023-02-09 NOTE — PROGRESS NOTES
32 y.o. female  at 38w6d   Reports fetal movement or fluttering. Denies any vaginal bleeding, leakage of fluid, cramping, contractions, or pressure.   She complains of contractions  Pt states she is doing well without any concerns.          Prenatal Labs:  Lab Results   Component Value Date    GROUPTRH O POS 2022    HGB 9.3 (L) 2023    HCT 30.0 (L) 2023     2023    SICKLE Negative 2022    HEPBSAG Non-Reactive 2022    [id number] Non-Reactive 2022    LABNGO Invalid 2023    LABURIN Skin/Urogenital Aisha Isolated, no further workup. 2022       The following were addressed during this visit:  - Importance of Breastfeeding       Daily fetal kick counts, bleeding, and  labor/labor precautions discussed.  Questions answered to desired level of satisfaction  Verbalized understanding to all information and instructions provided.    Total weight gain/weight loss in pregnancy: 8.981 kg (19 lb 12.8 oz)     No follow-ups on file.    A: 38w6d     ICD-10-CM ICD-9-CM    1.  screening encounter  Z36.9 V28.9 POCT Urinalysis      2. 38 weeks gestation of pregnancy  Z3A.38 V22.2 POCT Urinalysis            EBEN GODWIN M.D., FCOG    OB/GYN

## 2023-02-10 PROCEDURE — 51702 INSERT TEMP BLADDER CATH: CPT

## 2023-02-10 PROCEDURE — 72200006 HC VAGINAL DELIVERY LEVEL III

## 2023-02-10 PROCEDURE — 11000001 HC ACUTE MED/SURG PRIVATE ROOM

## 2023-02-10 PROCEDURE — 27000744 HC TRAY, FOLEY CATH W/BAG

## 2023-02-10 PROCEDURE — 72100003 HC LABOR CARE, EA. ADDL. 8 HRS

## 2023-02-10 PROCEDURE — 25000003 PHARM REV CODE 250: Performed by: OBSTETRICS & GYNECOLOGY

## 2023-02-10 PROCEDURE — 72100002 HC LABOR CARE, 1ST 8 HOURS

## 2023-02-10 PROCEDURE — 59610 PR ROUT OB CARE,VAG DELIV,PREV C-SEC: ICD-10-PCS | Mod: ,,, | Performed by: OBSTETRICS & GYNECOLOGY

## 2023-02-10 PROCEDURE — 25000003 PHARM REV CODE 250: Performed by: ANESTHESIOLOGY

## 2023-02-10 PROCEDURE — 63600175 PHARM REV CODE 636 W HCPCS: Performed by: OBSTETRICS & GYNECOLOGY

## 2023-02-10 RX ORDER — MISOPROSTOL 200 UG/1
800 TABLET ORAL ONCE AS NEEDED
Status: DISCONTINUED | OUTPATIENT
Start: 2023-02-10 | End: 2023-02-10

## 2023-02-10 RX ORDER — OXYTOCIN/RINGER'S LACTATE 30/500 ML
95 PLASTIC BAG, INJECTION (ML) INTRAVENOUS ONCE
Status: DISCONTINUED | OUTPATIENT
Start: 2023-02-10 | End: 2023-02-12 | Stop reason: HOSPADM

## 2023-02-10 RX ORDER — CARBOPROST TROMETHAMINE 250 UG/ML
250 INJECTION, SOLUTION INTRAMUSCULAR
Status: DISCONTINUED | OUTPATIENT
Start: 2023-02-10 | End: 2023-02-12 | Stop reason: HOSPADM

## 2023-02-10 RX ORDER — ONDANSETRON 4 MG/1
8 TABLET, ORALLY DISINTEGRATING ORAL EVERY 8 HOURS PRN
Status: DISCONTINUED | OUTPATIENT
Start: 2023-02-10 | End: 2023-02-12 | Stop reason: HOSPADM

## 2023-02-10 RX ORDER — MISOPROSTOL 200 UG/1
800 TABLET ORAL ONCE AS NEEDED
Status: DISCONTINUED | OUTPATIENT
Start: 2023-02-10 | End: 2023-02-12 | Stop reason: HOSPADM

## 2023-02-10 RX ORDER — ACETAMINOPHEN 325 MG/1
650 TABLET ORAL EVERY 6 HOURS PRN
Status: DISCONTINUED | OUTPATIENT
Start: 2023-02-10 | End: 2023-02-12 | Stop reason: HOSPADM

## 2023-02-10 RX ORDER — METHYLERGONOVINE MALEATE 0.2 MG/ML
200 INJECTION INTRAVENOUS
Status: DISCONTINUED | OUTPATIENT
Start: 2023-02-10 | End: 2023-02-12 | Stop reason: HOSPADM

## 2023-02-10 RX ORDER — OXYTOCIN 10 [USP'U]/ML
10 INJECTION, SOLUTION INTRAMUSCULAR; INTRAVENOUS ONCE AS NEEDED
Status: DISCONTINUED | OUTPATIENT
Start: 2023-02-10 | End: 2023-02-10

## 2023-02-10 RX ORDER — IBUPROFEN 800 MG/1
800 TABLET ORAL EVERY 8 HOURS PRN
Status: DISCONTINUED | OUTPATIENT
Start: 2023-02-10 | End: 2023-02-12 | Stop reason: HOSPADM

## 2023-02-10 RX ORDER — OXYTOCIN/RINGER'S LACTATE 30/500 ML
334 PLASTIC BAG, INJECTION (ML) INTRAVENOUS ONCE AS NEEDED
Status: DISCONTINUED | OUTPATIENT
Start: 2023-02-10 | End: 2023-02-10

## 2023-02-10 RX ORDER — DIPHENHYDRAMINE HCL 25 MG
25 CAPSULE ORAL EVERY 4 HOURS PRN
Status: DISCONTINUED | OUTPATIENT
Start: 2023-02-10 | End: 2023-02-12 | Stop reason: HOSPADM

## 2023-02-10 RX ORDER — PROCHLORPERAZINE EDISYLATE 5 MG/ML
5 INJECTION INTRAMUSCULAR; INTRAVENOUS EVERY 6 HOURS PRN
Status: DISCONTINUED | OUTPATIENT
Start: 2023-02-10 | End: 2023-02-12 | Stop reason: HOSPADM

## 2023-02-10 RX ORDER — OXYTOCIN/RINGER'S LACTATE 30/500 ML
95 PLASTIC BAG, INJECTION (ML) INTRAVENOUS ONCE AS NEEDED
Status: DISCONTINUED | OUTPATIENT
Start: 2023-02-10 | End: 2023-02-10

## 2023-02-10 RX ORDER — DIPHENHYDRAMINE HYDROCHLORIDE 50 MG/ML
25 INJECTION INTRAMUSCULAR; INTRAVENOUS EVERY 4 HOURS PRN
Status: DISCONTINUED | OUTPATIENT
Start: 2023-02-10 | End: 2023-02-12 | Stop reason: HOSPADM

## 2023-02-10 RX ORDER — PRENATAL WITH FERROUS FUM AND FOLIC ACID 3080; 920; 120; 400; 22; 1.84; 3; 20; 10; 1; 12; 200; 27; 25; 2 [IU]/1; [IU]/1; MG/1; [IU]/1; MG/1; MG/1; MG/1; MG/1; MG/1; MG/1; UG/1; MG/1; MG/1; MG/1; MG/1
1 TABLET ORAL DAILY
Status: DISCONTINUED | OUTPATIENT
Start: 2023-02-10 | End: 2023-02-12 | Stop reason: HOSPADM

## 2023-02-10 RX ORDER — TRANEXAMIC ACID 10 MG/ML
1000 INJECTION, SOLUTION INTRAVENOUS ONCE AS NEEDED
Status: DISCONTINUED | OUTPATIENT
Start: 2023-02-10 | End: 2023-02-12 | Stop reason: HOSPADM

## 2023-02-10 RX ORDER — DOCUSATE SODIUM 100 MG/1
200 CAPSULE, LIQUID FILLED ORAL 2 TIMES DAILY PRN
Status: DISCONTINUED | OUTPATIENT
Start: 2023-02-10 | End: 2023-02-12 | Stop reason: HOSPADM

## 2023-02-10 RX ORDER — SODIUM CHLORIDE 0.9 % (FLUSH) 0.9 %
10 SYRINGE (ML) INJECTION
Status: DISCONTINUED | OUTPATIENT
Start: 2023-02-10 | End: 2023-02-12 | Stop reason: HOSPADM

## 2023-02-10 RX ORDER — HYDROCODONE BITARTRATE AND ACETAMINOPHEN 5; 325 MG/1; MG/1
1 TABLET ORAL EVERY 4 HOURS PRN
Status: DISCONTINUED | OUTPATIENT
Start: 2023-02-10 | End: 2023-02-12 | Stop reason: HOSPADM

## 2023-02-10 RX ORDER — HYDROCORTISONE 25 MG/G
CREAM TOPICAL 3 TIMES DAILY PRN
Status: DISCONTINUED | OUTPATIENT
Start: 2023-02-10 | End: 2023-02-12 | Stop reason: HOSPADM

## 2023-02-10 RX ORDER — SIMETHICONE 80 MG
1 TABLET,CHEWABLE ORAL EVERY 6 HOURS PRN
Status: DISCONTINUED | OUTPATIENT
Start: 2023-02-10 | End: 2023-02-12 | Stop reason: HOSPADM

## 2023-02-10 RX ORDER — HYDROCODONE BITARTRATE AND ACETAMINOPHEN 7.5; 325 MG/1; MG/1
1 TABLET ORAL EVERY 4 HOURS PRN
Status: DISCONTINUED | OUTPATIENT
Start: 2023-02-10 | End: 2023-02-12 | Stop reason: HOSPADM

## 2023-02-10 RX ADMIN — HYDROCODONE BITARTRATE AND ACETAMINOPHEN 1 TABLET: 7.5; 325 TABLET ORAL at 08:02

## 2023-02-10 RX ADMIN — SODIUM CHLORIDE, POTASSIUM CHLORIDE, SODIUM LACTATE AND CALCIUM CHLORIDE: 600; 310; 30; 20 INJECTION, SOLUTION INTRAVENOUS at 02:02

## 2023-02-10 RX ADMIN — OXYTOCIN 334 MILLI-UNITS/MIN: 10 INJECTION, SOLUTION INTRAMUSCULAR; INTRAVENOUS at 07:02

## 2023-02-10 RX ADMIN — IBUPROFEN 800 MG: 800 TABLET ORAL at 10:02

## 2023-02-10 RX ADMIN — HYDROCODONE BITARTRATE AND ACETAMINOPHEN 1 TABLET: 7.5; 325 TABLET ORAL at 11:02

## 2023-02-10 RX ADMIN — IBUPROFEN 800 MG: 800 TABLET ORAL at 08:02

## 2023-02-10 RX ADMIN — EPHEDRINE SULFATE 10 MG: 50 INJECTION INTRAVENOUS at 12:02

## 2023-02-10 RX ADMIN — HYDROCODONE BITARTRATE AND ACETAMINOPHEN 1 TABLET: 7.5; 325 TABLET ORAL at 03:02

## 2023-02-10 RX ADMIN — SODIUM CHLORIDE, POTASSIUM CHLORIDE, SODIUM LACTATE AND CALCIUM CHLORIDE: 600; 310; 30; 20 INJECTION, SOLUTION INTRAVENOUS at 05:02

## 2023-02-10 NOTE — HPI
This is a 32-year-old  001 admitted at 39 weeks 0 day secondary spontaneous rupture membranes.  Patient with history of previous  x1 desires trial of labor with plans for .  Prenatal care with Dr. Pena complicated by ultrasound findings intracardiac echogenic focus during an  scan.  Patient was comanaged with Dr. Greenwood with no further complications.   consent reviewed and signed in the office.

## 2023-02-10 NOTE — ANESTHESIA PREPROCEDURE EVALUATION
2023  Bette De La Cruz is a 32 y.o., female.      Pre-op Assessment    I have reviewed the Patient Summary Reports.       I have reviewed the Medications.     Review of Systems         Anesthesia Plan  Type of Anesthesia, risks & benefits discussed:    Anesthesia Type: Epidural  Intra-op Monitoring Plan: Standard ASA Monitors  Post Op Pain Control Plan: multimodal analgesia  Informed Consent: Informed consent signed with the Patient and all parties understand the risks and agree with anesthesia plan.  All questions answered.   ASA Score: 2    Ready For Surgery From Anesthesia Perspective.     .  NKDA    Hct 32  Platelets 237     at 38 weeks for     Airway exam deferred (COVID precautions); adequate ROM at neck.    Plan is labor epidural

## 2023-02-10 NOTE — L&D DELIVERY NOTE
"[facility] -  Labor and Delivery  Vaginal Delivery   Operative Note    SUMMARY     Vacuum assisted vaginal delivery of live infant, was placed on mothers abdomen for skin to skin and bulb suctioning performed.  Infant delivered position OA with episiotomy done for fetal decelerations .  Nuchal cord: No.    Spontaneous delivery of placenta and IV pitocin given noting good uterine tone.  3rd degree laceration noted and repaired in normal fashion with repaired with 3-0 and 2-0 chromic.  Be rectal sphincter was closed in interrupted sutures using 3-0 chromic.  Vaginal mucosa was closed with 2-0 chromic .  Patient tolerated delivery well. Sponge needle and lap counted correctly x2.    Indications: Delayed delivery after SROM (spontaneous rupture of membranes)  Pregnancy complicated by:   Patient Active Problem List   Diagnosis    Postpartum care following vaginal delivery     Admitting GA: 39w0d    Delivery Information for Hortencia De La Cruz    Birth information:  YOB: 2024   Time of birth: 7:52 AM   Sex: female   Head Delivery Date/Time: 2/10/2023  7:52 AM   Delivery type: Vaginal, Vacuum (Extractor)   Gestational Age: 39w0d    Delivery Providers    Delivering clinician: Genie Lafleur MD           Measurements    Weight: 3204 g  Weight (lbs): 7 lb 1 oz  Length: 50.8 cm  Length (in): 20"         Apgars    Living status: Living  Apgars:  1 min.:  5 min.:  10 min.:  15 min.:  20 min.:    Skin color:  0  1       Heart rate:  2  2       Reflex irritability:  2  2       Muscle tone:  2  2       Respiratory effort:  2  2       Total:  8  9       Apgars assigned by: ESTHER FARIA RN         Operative Delivery    Forceps attempted?: No  Vacuum extractor attempted?: Yes  Vacuum indications: Fetal Heart Rate or Rhythm Abnormality  Vacuum type: Kiwi  Number of pop offs: 2  Number of pulls with vacuum: 2  Vacuum applied by: DR. SILVERMAN  Failed vacuum delivery?: No         Shoulder Dystocia    Shoulder " dystocia present?: No           Presentation    Presentation: Vertex           Interventions/Resuscitation    Method: Bulb Suctioning, Tactile Stimulation       Cord    Vessels: Unknown  Complications: None  Delayed Cord Clamping?: Yes  Cord Blood Disposition: Sent with Baby  Gases Sent?: No  Stem Cell Collection (by MD): No       Placenta    Placenta delivery date/time: 2/10/2023 075  Placenta removal: Spontaneous  Placenta appearance: Intact  Placenta disposition: discarded           Labor Events:       labor: No     Labor Onset Date/Time: 2023 16:15     Dilation Complete Date/Time: 02/10/2023 02:00     Start Pushing Date/Time: 02/10/2023 07:38     Rupture Date/Time: 23  161         Rupture type: SRM (Spontaneous Rupture)           Fluid Amount:         Fluid Color: Clear         steroids: None     Antibiotics given for GBS: No     Induction:       Indications for induction:        Augmentation:       Indications for augmentation:       Labor complications: None     Additional complications:          Cervical ripening:                     Delivery:      Episiotomy: Median     Indication for Episiotomy:       Perineal Lacerations: 3rd Repaired:  Yes   Periurethral Laceration:   Repaired:     Labial Laceration:   Repaired:     Sulcus Laceration:   Repaired:     Vaginal Laceration:   Repaired:     Cervical Laceration:   Repaired:     Repair suture:       Repair # of packets: 2     Last Value - EBL - Nursing (mL):       Sum - EBL - Nursing (mL): 0     Last Value - EBL - Anesthesia (mL):        Calculated QBL (mL):         Vaginal Sweep Performed: Yes     Surgicount Correct: Yes       Other providers:       Anesthesia    Method: Epidural          Details (if applicable):  Trial of Labor      Categorization:      Priority:     Indications for :     Incision Type:       Additional  information:  Forceps:    Vacuum:    Breech:    Observed anomalies    Other  (Comments):

## 2023-02-10 NOTE — H&P
[facility] -  Labor and Delivery  History & Physical  Obstetrics   Labor and Delivery Triage    SUBJECTIVE:     Patient Info:  Bette De La Cruz         32 y.o.    female    1990     Chief Complaint/Reason for Admission:  Spontaneous rupture membranes    History of Present Illness:  Patient is a 32-year-old  2 para 1 who presents at 38 weeks and 6 days with complaints of spontaneous rupture membranes at 1615 today.  She has a history of a previous  section and had discussed the possibility of a trial of labor after  with her provider,  Dr. Pena.  However, according to the triage nurse, presenting part is out of the pelvis as it was at her appointment in the clinic today.  Cervix was closed and 60%.   An ultrasound was  obtained to verify the presentation.  The patient also ate at 1700 prior to coming to labor and delivery.  Dr. Pena was notified of the patient's arrival on  labor and delivery. She will be admitted for a repeat csection by Dr. Pena.  Patient denies headaches blurred vision nausea vomiting or decrease in fetal movement.  She is having an occasional contraction fetal heart tracing is reactive and category 1.  OB History:   OB History          2    Para   1    Term   1            AB        Living   1         SAB        IAB        Ectopic        Multiple        Live Births   1                   Estimated Date of Delivery: 23     Gestational Age:  38w6d    Past Medical History:  No past medical history on file.     Past Surgical History:  Past Surgical History:   Procedure Laterality Date     SECTION         Social History:   Alcohol/Tobacco:  Social History     Tobacco Use    Smoking status: Never     Passive exposure: Never    Smokeless tobacco: Never   Substance Use Topics    Alcohol use: Not Currently      Drug Use:  Social History     Substance and Sexual Activity   Drug Use Never       Family History:  Family History   Family  "history unknown: Yes       Allergies:  Review of patient's allergies indicates:  No Known Allergies    Meds Prior to Arrival:  Prior to Admission medications    Medication Sig Start Date End Date Taking? Authorizing Provider   breast pump Alejandra 1 Pump by Misc.(Non-Drug; Combo Route) route daily as needed (double electric breast pump to use as needed). Double Electric breast pump PRN  Patient not taking: Reported on 1/19/2023 1/3/23 1/3/24  Genie Lafleur MD   diclofenac sodium (VOLTAREN) 1 % Gel APPLY 4 GRAMS TOPICALLY TWICE A DAY FOR PAIN 1/20/23   Historical Provider   iron, carbonyl, (ICAR) 15 mg/1.25 mL Susp Take by mouth.    Historical Provider   loratadine (CLARITIN) 10 mg tablet 10 mg. 8/2/22   Historical Provider   [id number]-iron-iron ps-om3-dha (CONCEPT DHA) 35-1-200 mg Cap Take 1 capsule by mouth once daily. 6/21/22 6/21/23  Genie Lafleur MD   omeprazole (PRILOSEC) 20 MG capsule 20 mg. 8/2/22   Historical Provider        Review of Systems:  Constitutional: no fever or chills  Eyes: no visual changes  ENT: no nasal congestion or sore throat  Respiratory: no cough or shortness of breath  Cardiovascular: no chest pain or palpitations  Gastrointestinal: no nausea or vomiting, tolerating diet  Genitourinary: no hematuria or dysuria  Hematologic/Lymphatic: no easy bruising or lymphadenopathy  Musculoskeletal: no arthralgias or myalgias  Neurological: no seizures or tremors  Behavioral/Psych: no auditory or visual hallucinations  Endocrine: no heat or cold intolerance    OBJECTIVE:     Recent Vitals:  BP (!) 124/58   Pulse 68   Temp 97.5 °F (36.4 °C)   Resp 20   Ht 5' 2" (1.575 m)   Wt 84.5 kg (186 lb 3.2 oz)   LMP 05/13/2022     Exam:Per Nurse- Closed/60%/ out of pelvis    General: well developed, well nourished, no distress  Lungs:  normal respiratory effort  Heart: regular rate and rhythm  Abdomen: soft, non-tender non-distented; bowel sounds normal; no masses,  no organomegaly  Pelvic:  as " above  Extremities: no cyanosis or edema, or clubbing    Lab Results:  Recent Results (from the past 36 hour(s))   POCT Urinalysis    Collection Time: 23  9:45 AM   Result Value Ref Range    WBC, UA small     Nitrite, UA neg     Urobilinogen, UA 0.2     Protein, POC neg     pH, UA 8.0     Blood, UA neg     Spec Grav UA 1.020     Ketones, UA neg     Bilirubin, POC neg     Glucose, UA neg    Urinalysis, Reflex to Urine Culture    Collection Time: 23  7:05 PM    Specimen: Urine   Result Value Ref Range    Color, UA Colorless Colorless, Straw, Yellow, Light Yellow, Dark Yellow    Clarity, UA Slightly Cloudy Clear    pH, UA 7.5 5.0 to 8.0 pH Units    Leukocytes, UA Moderate (A) Negative    Nitrites, UA Negative Negative    Protein, UA Negative Negative    Glucose, UA Normal Normal mg/dL    Ketones, UA Negative Negative mg/dL    Urobilinogen, UA Normal 0.2, 1.0, Normal mg/dL    Bilirubin, UA Negative Negative    Blood, UA Negative Negative    Specific Gravity, UA 1.005 <=1.030   Urinalysis, Microscopic    Collection Time: 23  7:05 PM   Result Value Ref Range    WBC, UA 5-10 (A) None Seen, 0-5 /hpf    RBC, UA None Seen None Seen, 0-3 /hpf    Bacteria, UA Moderate (A) None Seen /hpf    Yeast, UA None Seen None Seen /hpf    Squamous Epithelial Cells, UA Moderate (A) None Seen, Rare, None Seen To Occasional /lpf    Mucus, UA None Seen None Seen /hpf    Trichomonas, UA None Seen None Seen /hpf     Lab Results   Component Value Date    GROUPTRH O POS 2022          Diagnostic Studies:    Baseline: 140 bpm, Variability: Good {> 6 bpm), and Accelerations: Reactive  Occ. contraction        ASSESSMENT/PLAN:     Dx:Pregnant and not yet delivered [Z34.90]  Active Hospital Problems    Diagnosis  POA    *Delayed delivery after SROM (spontaneous rupture of membranes) [O42.90]  Yes     .      38 weeks gestation of pregnancy [Z3A.38]  Not Applicable     .      Previous  section complicating pregnancy,  antepartum condition or complication [O34.219]  Yes     .      Failure of fetal head to enter pelvic brim [O32.4XX0]  Yes     .        Resolved Hospital Problems   No resolved problems to display.    Patient ate a full meal at 5:00 p.m. on 2023 following spontaneous rupture membranes at 4:15 p.m. today.  Vertex presentation is documented by ultrasound with a fetal head out of the pelvis.      Admit to MD: Dr. Jean HAND    Admit to: admitted to the hospital for delivery by     Code Status: Full Code       Diagnostic Plan/Orders:  Orders Placed This Encounter   Procedures    Urine culture    US OB Limited 1 Or More Gestations    Urinalysis, Reflex to Urine Culture    Urinalysis, Microscopic    Vital Signs (Specify Frequency)    Fetal monitoring    Continuous tocometry    Discharge Criteria    Notify clinical provider    Notify clinical provider    Full code    Obtain Fetal nonstress test (NST)    Place in Outpatient        Scheduled Meds/IV Therapy:         PRN Meds:      Greg Freitas MD  OB Hospitalist  Hospitalist phone: [phone]  2023   8:07 PM

## 2023-02-10 NOTE — ANESTHESIA PROCEDURE NOTES
Epidural    Patient location during procedure: OB   Reason for block: primary anesthetic   Reason for block: labor analgesia requested by patient and obstetrician  Diagnosis: IUP   Start time: 2/9/2023 10:37 PM  Timeout: 2/9/2023 10:36 PM  End time: 2/9/2023 10:47 PM    Staffing  Performing Provider: Pedro Silver MD  Authorizing Provider: Pedro Silver MD        Preanesthetic Checklist  Completed: patient identified, pre-op evaluation, timeout performed, anesthesia consent given, hand hygiene performed and patient being monitored  Preparation  Patient position: sitting  Prep: Betadine  Reason for block: primary anesthetic   Epidural  Skin Anesthetic: lidocaine 1%  Administration type: single shot  Approach: midline  Interspace: L4-5    Injection technique: KELY air  Needle and Epidural Catheter  Insertion Attempts: 1  Test dose: 3 mL of lidocaine 1.5% with Epi 1-to-200,000  Additional Documentation: negative aspiration for heme and CSF  Needle localization: anatomical landmarks  Assessment  Ease of block: easy  Additional Notes  Informed consent. Aseptic technique.   L4/5 epidural catheter. Standard PCEA.   No complications.         Medications:    Medications: LIDOcaine (PF) 20 mg/mL (2%) injection - Epidural   10 mL - 2/9/2023 10:46:00 PM

## 2023-02-11 PROBLEM — Z3A.38 38 WEEKS GESTATION OF PREGNANCY: Status: RESOLVED | Noted: 2023-02-09 | Resolved: 2023-02-11

## 2023-02-11 LAB
ANISOCYTOSIS BLD QL SMEAR: ABNORMAL
BASOPHILS # BLD AUTO: 0.02 K/UL (ref 0–0.2)
BASOPHILS NFR BLD AUTO: 0.2 % (ref 0–1)
DIFFERENTIAL METHOD BLD: ABNORMAL
EOSINOPHIL # BLD AUTO: 0.06 K/UL (ref 0–0.5)
EOSINOPHIL NFR BLD AUTO: 0.5 % (ref 1–4)
ERYTHROCYTE [DISTWIDTH] IN BLOOD BY AUTOMATED COUNT: 17.3 % (ref 11.5–14.5)
HCT VFR BLD AUTO: 22.7 % (ref 38–47)
HGB BLD-MCNC: 7.1 G/DL (ref 12–16)
HYPOCHROMIA BLD QL SMEAR: ABNORMAL
IMM GRANULOCYTES # BLD AUTO: 0.06 K/UL (ref 0–0.04)
IMM GRANULOCYTES NFR BLD: 0.5 % (ref 0–0.4)
LYMPHOCYTES # BLD AUTO: 1.7 K/UL (ref 1–4.8)
LYMPHOCYTES NFR BLD AUTO: 13.1 % (ref 27–41)
LYMPHOCYTES NFR BLD MANUAL: 11 % (ref 27–41)
MCH RBC QN AUTO: 22.5 PG (ref 27–31)
MCHC RBC AUTO-ENTMCNC: 31.3 G/DL (ref 32–36)
MCV RBC AUTO: 72.1 FL (ref 80–96)
MICROCYTES BLD QL SMEAR: ABNORMAL
MONOCYTES # BLD AUTO: 0.62 K/UL (ref 0–0.8)
MONOCYTES NFR BLD AUTO: 4.8 % (ref 2–6)
MONOCYTES NFR BLD MANUAL: 4 % (ref 2–6)
MPC BLD CALC-MCNC: ABNORMAL G/DL
NEUTROPHILS # BLD AUTO: 10.52 K/UL (ref 1.8–7.7)
NEUTROPHILS NFR BLD AUTO: 80.9 % (ref 53–65)
NEUTS BAND NFR BLD MANUAL: 5 % (ref 1–5)
NEUTS SEG NFR BLD MANUAL: 80 % (ref 50–62)
NRBC # BLD AUTO: 0 X10E3/UL
NRBC, AUTO (.00): 0 %
OVALOCYTES BLD QL SMEAR: ABNORMAL
PLATELET # BLD AUTO: 182 K/UL (ref 150–400)
PLATELET MORPHOLOGY: ABNORMAL
POLYCHROMASIA BLD QL SMEAR: ABNORMAL
RBC # BLD AUTO: 3.15 M/UL (ref 4.2–5.4)
TARGETS BLD QL SMEAR: ABNORMAL
UA COMPLETE W REFLEX CULTURE PNL UR: NORMAL
WBC # BLD AUTO: 12.98 K/UL (ref 4.5–11)

## 2023-02-11 PROCEDURE — 25000003 PHARM REV CODE 250: Performed by: OBSTETRICS & GYNECOLOGY

## 2023-02-11 PROCEDURE — 11000001 HC ACUTE MED/SURG PRIVATE ROOM

## 2023-02-11 PROCEDURE — 85025 COMPLETE CBC W/AUTO DIFF WBC: CPT | Performed by: OBSTETRICS & GYNECOLOGY

## 2023-02-11 RX ADMIN — HYDROCODONE BITARTRATE AND ACETAMINOPHEN 1 TABLET: 7.5; 325 TABLET ORAL at 10:02

## 2023-02-11 RX ADMIN — DOCUSATE SODIUM 200 MG: 100 CAPSULE, LIQUID FILLED ORAL at 12:02

## 2023-02-11 RX ADMIN — HYDROCODONE BITARTRATE AND ACETAMINOPHEN 1 TABLET: 7.5; 325 TABLET ORAL at 04:02

## 2023-02-11 RX ADMIN — IBUPROFEN 800 MG: 800 TABLET ORAL at 03:02

## 2023-02-11 RX ADMIN — Medication 1 TABLET: at 10:02

## 2023-02-11 RX ADMIN — HYDROCODONE BITARTRATE AND ACETAMINOPHEN 1 TABLET: 7.5; 325 TABLET ORAL at 03:02

## 2023-02-11 RX ADMIN — HYDROCODONE BITARTRATE AND ACETAMINOPHEN 1 TABLET: 7.5; 325 TABLET ORAL at 12:02

## 2023-02-11 RX ADMIN — DOCUSATE SODIUM 200 MG: 100 CAPSULE, LIQUID FILLED ORAL at 09:02

## 2023-02-11 RX ADMIN — HYDROCODONE BITARTRATE AND ACETAMINOPHEN 1 TABLET: 7.5; 325 TABLET ORAL at 08:02

## 2023-02-11 RX ADMIN — Medication: at 11:02

## 2023-02-11 RX ADMIN — IBUPROFEN 800 MG: 800 TABLET ORAL at 07:02

## 2023-02-11 NOTE — PROGRESS NOTES
[facility] -  Labor and Delivery  Obstetrics  Postpartum Progress Note    Patient Name: Bette De La Cruz  MRN: [id number]  Admission Date: 2023  Hospital Length of Stay: 1 days  Attending Physician: Genie Lafleur MD  Primary Care Provider: ERIBERTO Funes    Subjective:     Principal Problem:Delayed delivery after SROM (spontaneous rupture of membranes)    Hospital course: Patient had a  on 2/10/23; no complications. Patient is postpartum day 1. She has no acute complaints at this time. Plan to discharge home tomorrow.          Objective:     Vital Signs (Most Recent):  Temp: 97.2 °F (36.2 °C) (23 0434)  Pulse: 69 (23 0435)  Resp: 18 (23 1054)  BP: 135/63 (23 0435)  SpO2: 99 % (23 0025) Vital Signs (24h Range):  Temp:  [97.2 °F (36.2 °C)-97.5 °F (36.4 °C)] 97.2 °F (36.2 °C)  Pulse:  [69-82] 69  Resp:  [17-18] 18  SpO2:  [99 %] 99 %  BP: (103-135)/(52-63) 135/63     Weight: 84.5 kg (186 lb 3.2 oz)  Body mass index is 34.06 kg/m².    No intake or output data in the 24 hours ending 23 1156    Physical Exam:   Constitutional: She is oriented to person, place, and time. She appears well-developed and well-nourished. No distress.    HENT:   Head: Normocephalic and atraumatic.    Eyes: Pupils are equal, round, and reactive to light.     Cardiovascular:  Normal rate and regular rhythm.      Exam reveals no edema.        Pulmonary/Chest: Effort normal and breath sounds normal. No respiratory distress.        Abdominal: Soft. Bowel sounds are normal. She exhibits no distension and no mass. There is no abdominal tenderness. There is no rebound and no guarding.     Genitourinary:    Vagina normal.      Pelvic exam was performed with patient supine.   The external female genitalia was normal.   No external genitalia lesions identified,Genitalia hair distrobution normal .   No  no vaginal discharge in the vagina.           Musculoskeletal: Normal range of motion.        Neurological: She is alert and oriented to person, place, and time. She has normal reflexes. She displays normal reflexes. She exhibits normal muscle tone.    Skin: Skin is warm and dry. No rash noted. No erythema.    Psychiatric: She has a normal mood and affect. Her behavior is normal.     Significant Labs:  Lab Results   Component Value Date    GROUPTRH O POS 2023    HEPBSAG Non-Reactive 2023     Recent Labs   Lab 23  0633   HGB 7.1*   HCT 22.7*       BMP: No results for input(s): GLU, NA, K, CL, CO2, BUN, CREATININE, CALCIUM, MG in the last 48 hours.  CBC:   Recent Labs   Lab 23  0633   WBC 12.98*   RBC 3.15*   HGB 7.1*   HCT 22.7*      MCV 72.1*   MCH 22.5*   MCHC 31.3*     CMP: No results for input(s): GLU, CALCIUM, ALBUMIN, PROT, NA, K, CO2, CL, BUN, CREATININE, ALKPHOS, ALT, AST, BILITOT in the last 48 hours.  I have personallly reviewed all pertinent lab results from the last 24 hours.  Recent Lab Results         23  0633        Aniso 1+       Bands 5       Baso # 0.02       Basophil % 0.2       Differential Type Manual       Eos # 0.06       Eosinophil % 0.5       Hematocrit 22.7       Hemoglobin 7.1       Hypo 2+       Immature Grans (Abs) 0.06       Immature Granulocytes 0.5       Lymph # 1.70       Lymph % 13.1        11       MCH 22.5       MCHC 31.3       MCV 72.1       Microcytosis 2+       Mono # 0.62       Mono % 4.8        4       MPV       Neutrophils, Abs 10.52       Neutrophils Relative 80.9       nRBC 0.0       NUCLEATED RBC ABSOLUTE 0.00       Ovalocytes Few       PLATELET MORPHOLOGY Few Large Platelets       Platelets 182       Poly Few       RBC 3.15       RDW 17.3       Segmented Neutrophils, Man % 80       Target Cells Few       WBC 12.98               Assessment/Plan:     32 y.o. female  at 39w0d for:    Active Diagnoses:    Diagnosis Date Noted POA    PRINCIPAL PROBLEM:  Delayed delivery after SROM (spontaneous rupture of membranes)  [O42.90] 2023 Yes    Postpartum care following vaginal delivery [Z39.2] 2023 Not Applicable    Previous  section complicating pregnancy, antepartum condition or complication [O34.219] 2023 Yes    Failure of fetal head to enter pelvic brim [O32.4XX0] 2023 Yes      Problems Resolved During this Admission:    Diagnosis Date Noted Date Resolved POA    38 weeks gestation of pregnancy [Z3A.38] 2023 Not Applicable       -Routine postpartum care.  -Pain control as needed.     Disposition: As patient meets milestones, will plan to discharge home tomorrow.    Gal Walton MD  Obstetrics  [facility] -  Labor and Delivery

## 2023-02-12 VITALS
SYSTOLIC BLOOD PRESSURE: 109 MMHG | WEIGHT: 186.19 LBS | DIASTOLIC BLOOD PRESSURE: 55 MMHG | HEIGHT: 62 IN | TEMPERATURE: 98 F | OXYGEN SATURATION: 99 % | HEART RATE: 64 BPM | BODY MASS INDEX: 34.26 KG/M2 | RESPIRATION RATE: 18 BRPM

## 2023-02-12 PROBLEM — O32.4XX0 FAILURE OF FETAL HEAD TO ENTER PELVIC BRIM: Status: RESOLVED | Noted: 2023-02-09 | Resolved: 2023-02-12

## 2023-02-12 PROBLEM — O42.90 DELAYED DELIVERY AFTER SROM (SPONTANEOUS RUPTURE OF MEMBRANES): Status: RESOLVED | Noted: 2023-02-09 | Resolved: 2023-02-12

## 2023-02-12 PROBLEM — O34.219 PREVIOUS CESAREAN SECTION COMPLICATING PREGNANCY, ANTEPARTUM CONDITION OR COMPLICATION: Status: RESOLVED | Noted: 2023-02-09 | Resolved: 2023-02-12

## 2023-02-12 PROCEDURE — 25000003 PHARM REV CODE 250: Performed by: OBSTETRICS & GYNECOLOGY

## 2023-02-12 RX ORDER — HYDROCODONE BITARTRATE AND ACETAMINOPHEN 5; 325 MG/1; MG/1
1 TABLET ORAL EVERY 4 HOURS PRN
Qty: 24 TABLET | Refills: 0 | Status: SHIPPED | OUTPATIENT
Start: 2023-02-12

## 2023-02-12 RX ORDER — DOCUSATE SODIUM 100 MG/1
200 CAPSULE, LIQUID FILLED ORAL 2 TIMES DAILY PRN
Qty: 60 CAPSULE | Refills: 0 | Status: SHIPPED | OUTPATIENT
Start: 2023-02-12

## 2023-02-12 RX ORDER — IBUPROFEN 800 MG/1
800 TABLET ORAL EVERY 8 HOURS PRN
Qty: 40 TABLET | Refills: 0 | Status: SHIPPED | OUTPATIENT
Start: 2023-02-12

## 2023-02-12 RX ADMIN — Medication 1 TABLET: at 09:02

## 2023-02-12 RX ADMIN — IBUPROFEN 800 MG: 800 TABLET ORAL at 07:02

## 2023-02-12 RX ADMIN — HYDROCODONE BITARTRATE AND ACETAMINOPHEN 1 TABLET: 7.5; 325 TABLET ORAL at 03:02

## 2023-02-12 RX ADMIN — HYDROCODONE BITARTRATE AND ACETAMINOPHEN 1 TABLET: 7.5; 325 TABLET ORAL at 07:02

## 2023-02-12 RX ADMIN — IBUPROFEN 800 MG: 800 TABLET ORAL at 12:02

## 2023-02-12 NOTE — DISCHARGE SUMMARY
[facility] -  Labor and Delivery  Discharge Note  Short Stay    * No surgery found *      OUTCOME: Patient tolerated treatment/procedure well without complication and is now ready for discharge.    DISPOSITION: Home or Self Care    FINAL DIAGNOSIS:  Delayed delivery after SROM (spontaneous rupture of membranes)    FOLLOWUP: In clinic    DISCHARGE INSTRUCTIONS:  No discharge procedures on file.      Clinical Reference Documents Added to Patient Instructions         Document    BREASTFEEDING (ENGLISH)    EXCLUSIVE BREASTFEEDING (ENGLISH)    NORMAL BLEEDING AFTER HAVING A BABY (ENGLISH)    POSTPARTUM EXERCISES (ENGLISH)    POSTPARTUM FITNESS (ENGLISH)    PUMPING BREAST MILK (ENGLISH)    TAKING CARE OF YOURSELF AFTER YOU HAVE A BABY (ENGLISH)    VAGINAL DELIVERY DISCHARGE INSTRUCTIONS (ENGLISH)    WHAT TO WATCH FOR AFTER YOU HAVE A BABY (ENGLISH)            TIME SPENT ON DISCHARGE: minutes

## 2023-02-12 NOTE — PLAN OF CARE
Problem: Adult Inpatient Plan of Care  Goal: Plan of Care Review  2023 1244 by Marilu Maldonado RN  Outcome: Met  2023 0914 by Marilu Maldonado RN  Outcome: Ongoing, Progressing  Goal: Patient-Specific Goal (Individualized)  2023 1244 by Marilu Maldonado RN  Outcome: Met  2023 0914 by Marilu Maldonado RN  Outcome: Ongoing, Progressing  Goal: Absence of Hospital-Acquired Illness or Injury  2023 1244 by Marilu Maldonado RN  Outcome: Met  2023 0914 by Marilu Maldonado RN  Outcome: Ongoing, Progressing  Goal: Optimal Comfort and Wellbeing  2023 1244 by Marilu Maldonado RN  Outcome: Met  2023 0914 by Marilu Maldonado RN  Outcome: Ongoing, Progressing  Goal: Readiness for Transition of Care  2023 1244 by Marilu Maldonado RN  Outcome: Met  2023 0914 by Marilu Maldonado RN  Outcome: Ongoing, Progressing     Problem:  Fall Injury Risk  Goal: Absence of Fall, Infant Drop and Related Injury  2023 1244 by Marilu Maldonado RN  Outcome: Met  2023 0914 by Marilu Maldonado RN  Outcome: Ongoing, Progressing     Problem: Infection  Goal: Absence of Infection Signs and Symptoms  2023 1244 by Marilu Maldonado RN  Outcome: Met  2023 0914 by Marilu Maldonado RN  Outcome: Ongoing, Progressing     Problem: Adjustment to Role Transition (Postpartum Vaginal Delivery)  Goal: Successful Maternal Role Transition  2023 1244 by Marilu Maldonado RN  Outcome: Met  2023 0914 by Marilu Maldonado RN  Outcome: Ongoing, Progressing     Problem: Bleeding (Postpartum Vaginal Delivery)  Goal: Hemostasis  2023 1244 by Marilu Maldonado RN  Outcome: Met  2023 09 by Marilu Maldonado RN  Outcome: Ongoing, Progressing     Problem: Infection (Postpartum Vaginal Delivery)  Goal: Absence of Infection Signs/Symptoms  2023 1244 by Marilu Maldonado RN  Outcome: Met  2023 0914 by Marilu Maldonado, RN  Outcome: Ongoing, Progressing     Problem: Pain  (Postpartum Vaginal Delivery)  Goal: Acceptable Pain Control  2/12/2023 1244 by Marilu Maldonado, RN  Outcome: Met  2/12/2023 0914 by Marilu Maldonado, RN  Outcome: Ongoing, Progressing

## 2023-02-13 NOTE — ANESTHESIA POSTPROCEDURE EVALUATION
Anesthesia Post Evaluation    Patient: Bette De La Cruz    Procedure(s) Performed: * No procedures listed *    Final Anesthesia Type: epidural      Patient location during evaluation: labor & delivery  Post-procedure vital signs: reviewed and stable  Pain management: adequate  Airway patency: patent  TOÑO mitigation strategies: Use of major conduction anesthesia (spinal/epidural) or peripheral nerve block  PONV status at discharge: No PONV  Anesthetic complications: no      Cardiovascular status: hemodynamically stable  Respiratory status: unassisted  Hydration status: euvolemic  Follow-up not needed.          Vitals Value Taken Time   /55 02/12/23 0748   Temp 36.4 °C (97.6 °F) 02/12/23 0748   Pulse 64 02/12/23 0748   Resp 18 02/12/23 0749   SpO2 99 % 02/11/23 0025         No case tracking events are documented in the log.      Pain/Colette Score: Pain Rating Prior to Med Admin: 6 (2/12/2023  7:49 AM)  Pain Rating Post Med Admin: 3 (2/12/2023  8:49 AM)

## 2023-02-14 NOTE — PHYSICIAN QUERY
PT Name: Bette De La Cruz  MR #: [id number]     DOCUMENTATION CLARIFICATION      CDS/: CHRISTOPH Pascual,RNC-MNN        Contact information:[email or web address]    This form is a permanent document in the medical record.     Query Date: February 14, 2023    By submitting this query, we are merely seeking further clarification of documentation.  Please utilize your independent clinical judgment when addressing the question(s) below.     Indicators Supporting Clinical Findings Location in Medical Record   X Delivery Type Delivery type: Vaginal, Vacuum (Extractor) L&D Delivery note 2/10   X 3rd degree Perineal Laceration or Tear documented Episiotomy: Median  Perineal lacerations: 3rd L&D Delivery note 2/10   X Repair documented Repaired?: Yes  Number of repair packets: 2 L&D Delivery note 2/10    Other       Provider, please specify diagnosis or diagnoses associated with above clinical findings.    [ x  ] Less than 50% of the external anal sphincter was torn (type III a)   [   ] More than 50% of the external anal sphincter was torn (type III b)   [   ] Both the external anal sphincter (EAS) and internal anal sphincter (IAS) were torn (type III c)   [   ] Other clarification of findings (specify):   [  ] Clinically undetermined       Please document in your progress notes daily for the duration of treatment, until resolved, and include in your discharge summary.

## 2023-02-24 ENCOUNTER — HOSPITAL ENCOUNTER (EMERGENCY)
Facility: HOSPITAL | Age: 33
Discharge: HOME | End: 2023-02-24
Attending: EMERGENCY MEDICINE
Payer: OTHER GOVERNMENT

## 2023-02-24 VITALS
HEART RATE: 97 BPM | RESPIRATION RATE: 18 BRPM | SYSTOLIC BLOOD PRESSURE: 138 MMHG | HEIGHT: 62 IN | TEMPERATURE: 99 F | OXYGEN SATURATION: 99 % | BODY MASS INDEX: 30.36 KG/M2 | WEIGHT: 165 LBS | DIASTOLIC BLOOD PRESSURE: 61 MMHG

## 2023-02-24 PROCEDURE — 99283 PR EMERGENCY DEPT VISIT,LEVEL III: ICD-10-PCS | Mod: ,,, | Performed by: EMERGENCY MEDICINE

## 2023-02-24 PROCEDURE — 99283 EMERGENCY DEPT VISIT LOW MDM: CPT | Mod: ,,, | Performed by: EMERGENCY MEDICINE

## 2023-02-24 PROCEDURE — 99281 EMR DPT VST MAYX REQ PHY/QHP: CPT
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
## 2023-02-24 NOTE — ASSESSMENT & PLAN NOTE
Patient counseled on appropriate bowel regimen.  Counseled on absolute pelvic rest until cleared by Dr Pena    Sityanique baths BID-TID until wound heealed.    Strict infection precautions.    Spoke with Dr Pena - Patient has appointment on Monday 2/27.    Patient to call/return with any change in her condition.

## 2023-02-24 NOTE — SUBJECTIVE & OBJECTIVE
OB History    Para Term  AB Living   2 2 2 0 0 2   SAB IAB Ectopic Multiple Live Births   0 0 0 0 2      # Outcome Date GA Lbr Mikey/2nd Weight Sex Delivery Anes PTL Lv   2 Term 02/10/23 39w0d 09:45 / 05:52 3.204 kg (7 lb 1 oz) F Vag-Vacuum EPI N ASA      Name: SONAL BERRY      Apgar1: 8  Apgar5: 9   1 Term 19 40w0d  2.835 kg (6 lb 4 oz) F CS-Unspec   ASA     No past medical history on file.  Past Surgical History:   Procedure Laterality Date     SECTION         (Not in a hospital admission)      Review of patient's allergies indicates:  No Known Allergies     Family History       Family history is unknown by patient.          Tobacco Use    Smoking status: Never     Passive exposure: Never    Smokeless tobacco: Never   Substance and Sexual Activity    Alcohol use: Not Currently    Drug use: Never    Sexual activity: Yes     Partners: Male     Review of Systems   Constitutional:  Negative for chills and fever.   Respiratory:  Negative for cough and shortness of breath.    Cardiovascular:  Negative for chest pain and leg swelling.   Gastrointestinal:  Negative for abdominal pain, blood in stool, constipation, diarrhea, nausea and vomiting.   Genitourinary:  Negative for dysuria, flank pain, frequency, hematuria, pelvic pain, urgency, vaginal bleeding, vaginal discharge, vaginal pain and vaginal odor.          Breakdown of perineum sutures (see HPI)   Musculoskeletal:  Negative for back pain.   Neurological:  Negative for syncope and headaches.   Psychiatric/Behavioral:  Negative for depression. The patient is not nervous/anxious.     Objective:     Vital Signs (Most Recent):  Temp: 99.2 °F (37.3 °C) (23 1049)  Pulse: 97 (23 1049)  Resp: 18 (23 1049)  BP: 138/61 (23 1049)  SpO2: 99 % (23 1049) Vital Signs (24h Range):  Temp:  [99.2 °F (37.3 °C)] 99.2 °F (37.3 °C)  Pulse:  [97] 97  Resp:  [18] 18  SpO2:  [99 %] 99 %  BP: (138)/(61) 138/61      Weight: 74.8 kg (165 lb)  Body mass index is 30.18 kg/m².    No LMP recorded.    Physical Exam:   Constitutional: She is oriented to person, place, and time. She appears well-developed and well-nourished.    HENT:   Head: Normocephalic and atraumatic.    Eyes: Pupils are equal, round, and reactive to light.     Cardiovascular:  Normal rate.      Exam reveals no edema.        Pulmonary/Chest: Effort normal. No respiratory distress.        Abdominal: Soft. She exhibits no distension and no mass. There is no abdominal tenderness. There is no rebound and no guarding.     Genitourinary:    Pelvic exam was performed with patient supine.   The external female genitalia was normal.   No external genitalia lesions identified,Genitalia hair distrobution normal .      Genitourinary Comments:   Chaperone present    The Subcuticular suture broke down and there is a dehiscence along the ascending suture line to the level of the introitus.  The underlying tissue shows normal granulation tissue without e/o infection.  The tissue is NONTENDER.  There is NO woody or indurated tissue.  There is no erythema.  The tissue in the vaginal canal is intact.  There is no underlying tissue distortion to the level of the recto-vaginal septum.    A digital rectal exam finds the sphincter intact. There is no communication that could be determined at this exam between the rectum and vagina.                 Musculoskeletal: Normal range of motion.       Neurological: She is alert and oriented to person, place, and time. She has normal reflexes. She displays normal reflexes. She exhibits normal muscle tone.    Skin: Skin is warm and dry. No rash noted. No erythema.    Psychiatric: She has a normal mood and affect. Her behavior is normal.     Laboratory:  Recent Lab Results       None          I have personallly reviewed all pertinent lab results from the last 24 hours.    Diagnostic Results:  Labs: Reviewed
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
## 2023-02-24 NOTE — ED NOTES
DR. Caraballo with OBGYN came and examined pt at bedside. Pt tolerated comfortably and discharge instruction regarding care of episiotomy given. Pt understands.   Will give patient sitz bath supplies.

## 2023-02-24 NOTE — ED PROVIDER NOTES
Encounter Date: 2023    SCRIBE #1 NOTE: I, Tatiana Henry, am scribing for, and in the presence of,  Rober Newsome MD. I have scribed the entire note.     History     Chief Complaint   Patient presents with    Wound Check     The patient is a 32 y.o. female that presents to the emergency department due to a wound check. The patient explains that 2 weeks ago she had a vaginal delivery and she thinks one of her stitches may have come out. She called her OB Dr. Pena and was told to come in. This is her 2nd child and she is breastfeeding. She denies any constipation, vaginal discharge, or dysuria. No other symptoms or medical hx were reported.    The history is provided by the patient. No  was used.   Review of patient's allergies indicates:  No Known Allergies  No past medical history on file.  Past Surgical History:   Procedure Laterality Date     SECTION       Family History   Family history unknown: Yes     Social History     Tobacco Use    Smoking status: Never     Passive exposure: Never    Smokeless tobacco: Never   Substance Use Topics    Alcohol use: Not Currently    Drug use: Never     Review of Systems   Constitutional: Negative.    HENT: Negative.     Eyes: Negative.    Respiratory: Negative.     Cardiovascular: Negative.    Gastrointestinal:  Negative for constipation.   Endocrine: Negative.    Genitourinary:  Positive for vaginal bleeding. Negative for dysuria and vaginal discharge.   Musculoskeletal: Negative.    Skin: Negative.    Allergic/Immunologic: Negative.    Neurological: Negative.    Hematological: Negative.    Psychiatric/Behavioral: Negative.     All other systems reviewed and are negative.    Physical Exam     Initial Vitals [23 1049]   BP Pulse Resp Temp SpO2   138/61 97 18 99.2 °F (37.3 °C) 99 %      MAP       --         Physical Exam    Constitutional: She appears well-developed and well-nourished.   Eyes: Conjunctivae and EOM are normal. Pupils  are equal, round, and reactive to light.   Neck: Neck supple.   Normal range of motion.  Cardiovascular:  Normal heart sounds.           Pulmonary/Chest: Breath sounds normal.   Abdominal: Abdomen is soft. Bowel sounds are normal.   1cm tear near rectum    Musculoskeletal:         General: Normal range of motion.      Cervical back: Normal range of motion and neck supple.     Neurological: She is alert and oriented to person, place, and time. She has normal strength and normal reflexes.   Skin: Skin is warm and dry.   Psychiatric: She has a normal mood and affect. Her behavior is normal. Judgment and thought content normal.       ED Course   Procedures  Labs Reviewed - No data to display       Imaging Results    None          Medications - No data to display  Medical Decision Making:   ED Management:  MDM    Patient presents for emergent evaluation of acute dehiscence of episiotomy that poses a threat to life and/or bodily function.    In the ED patient found to have acute dehiscence of episiotomy    Discharge MDM  I discussed the patient presentation labs findings with the consultant for OB hospitalist (speciality).    Patient was discharged in stable condition.  Detailed return precautions discussed.   See ED course          Attending Attestation:           Physician Attestation for Scribe:  Physician Attestation Statement for Scribe #1: I, Rober Newsome MD, reviewed documentation, as scribed by Tatiana Henry in my presence, and it is both accurate and complete.           ED Course as of 02/24/23 1700   Fri Feb 24, 2023   1130 Discussed with OB hospitalist.  Will come to the ER to see the patient. [PK]   1221 Dr Caraballo evaluating patient   [PK]   1235 Discussed with OB hospitalist.  Has seen the patient.  Good granulation tissue.  Only superficial/subcutaneous tissue that has dehisced.  Does not involve the anal sphincter.  Recommend Sitz baths twice daily.  No sexual activity. [PK]      ED Course User  Index  [PK] Rober Newsome MD                 Clinical Impression:   Final diagnoses:  [O90.1] Episiotomy dehiscence (Primary)        ED Disposition Condition    Discharge Stable          ED Prescriptions    None       Follow-up Information       Follow up With Specialties Details Why Contact Info    [facility] - Emergency Department Emergency Medicine Go to  As needed, If symptoms worsen [address] [place] [address] [id number] [phone]    Genie Lafleur MD Obstetrics and Gynecology Schedule an appointment as soon as possible for a visit   [address] [place] [address]  [facility] [id number]  [phone]               Rober Newsome MD  02/24/23 1705

## 2023-03-03 ENCOUNTER — PATIENT MESSAGE (OUTPATIENT)
Dept: OBSTETRICS AND GYNECOLOGY | Facility: CLINIC | Age: 33
End: 2023-03-03
Payer: OTHER GOVERNMENT

## 2023-03-06 ENCOUNTER — OFFICE VISIT (OUTPATIENT)
Dept: OBSTETRICS AND GYNECOLOGY | Facility: CLINIC | Age: 33
End: 2023-03-06
Payer: OTHER GOVERNMENT

## 2023-03-06 VITALS
HEIGHT: 62 IN | HEART RATE: 66 BPM | TEMPERATURE: 98 F | WEIGHT: 159.81 LBS | RESPIRATION RATE: 18 BRPM | BODY MASS INDEX: 29.41 KG/M2 | SYSTOLIC BLOOD PRESSURE: 120 MMHG | DIASTOLIC BLOOD PRESSURE: 77 MMHG | OXYGEN SATURATION: 99 %

## 2023-03-06 PROCEDURE — 0503F PR POSTPARTUM CARE VISIT: ICD-10-PCS | Mod: ,,, | Performed by: ADVANCED PRACTICE MIDWIFE

## 2023-03-06 PROCEDURE — 0503F POSTPARTUM CARE VISIT: CPT | Mod: ,,, | Performed by: ADVANCED PRACTICE MIDWIFE

## 2023-03-06 RX ORDER — ACETAMINOPHEN 500 MG
5000 TABLET ORAL DAILY
COMMUNITY

## 2023-03-06 NOTE — PROGRESS NOTES
"CC: Post-partum follow-up    Bette De La Cruz is a 32 y.o. female  who presents for post-partum visit.  She is S/P a .  She and the baby are  doing well.  States was seen at ER last week for f/u for incision being "opened"- was told everything was good. No pain.  No fever.   No bowel / bladder complaints.    Delivery Date: February 10, 2023  Delivery MD: Dr. Pena  Gender: female  Birth Weight: 7 pounds 1 ounces  Breast Feeding: YES  Depression: NO  Contraception: natural family planning (NFP)    Pregnancy was complicated by:  N/a    /77   Pulse 66   Temp 97.5 °F (36.4 °C) (Oral)   Resp 18   Ht 5' 2" (1.575 m)   Wt 72.5 kg (159 lb 12.8 oz)   LMP  (LMP Unknown)   SpO2 99%   Breastfeeding Yes   BMI 29.23 kg/m²     ROS:  GENERAL: No fever, chills, fatigability.  VULVAR: No pain, no lesions and no itching.  VAGINAL: No relaxation, no itching, no discharge, no abnormal bleeding and no lesions.  ABDOMEN: No abdominal pain. Denies nausea. Denies vomiting. No diarrhea. No constipation  BREAST: Denies pain. No lumps. No discharge.  URINARY: No incontinence, no nocturia, no frequency and no dysuria.  CARDIOVASCULAR: No chest pain. No shortness of breath. No leg cramps.  NEUROLOGICAL: No headaches. No vision changes.    PHYSICAL EXAM:  ABDOMEN:  Soft, non-tender, non-distended  VULVA:  Normal, no lesions  CERVIX:  Without lesions, polyps or tenderness.  UTERUS:  Normal size, shape, consistency, no mass or tenderness.  ADNEXA:  Normal in size without mass or tenderness    IMP:  4 Weeks Postpartum  Status Post   Postpartum exam        ICD-10-CM ICD-9-CM    1. Postpartum exam  Z39.2 V24.2           PLAN:  May resume normal activities  Birth control options and counseling discussed  Verbalized understanding to all information and instructions  Questions answered to desired level of satisfaction    Follow up if symptoms worsen or fail to improve, for f/u @ 23 with Dr. Pena for annual exam.        "

## 2023-05-07 ENCOUNTER — PATIENT MESSAGE (OUTPATIENT)
Dept: OBSTETRICS AND GYNECOLOGY | Facility: CLINIC | Age: 33
End: 2023-05-07
Payer: OTHER GOVERNMENT
